# Patient Record
Sex: MALE | Race: WHITE | NOT HISPANIC OR LATINO | URBAN - METROPOLITAN AREA
[De-identification: names, ages, dates, MRNs, and addresses within clinical notes are randomized per-mention and may not be internally consistent; named-entity substitution may affect disease eponyms.]

---

## 2017-03-11 ENCOUNTER — EMERGENCY (EMERGENCY)
Facility: HOSPITAL | Age: 72
LOS: 1 days | Discharge: HOME | End: 2017-03-11
Attending: EMERGENCY MEDICINE | Admitting: EMERGENCY MEDICINE
Payer: MEDICARE

## 2017-03-11 VITALS
SYSTOLIC BLOOD PRESSURE: 132 MMHG | OXYGEN SATURATION: 94 % | HEART RATE: 60 BPM | DIASTOLIC BLOOD PRESSURE: 78 MMHG | RESPIRATION RATE: 16 BRPM

## 2017-03-11 VITALS
RESPIRATION RATE: 16 BRPM | HEART RATE: 60 BPM | TEMPERATURE: 98 F | OXYGEN SATURATION: 93 % | SYSTOLIC BLOOD PRESSURE: 124 MMHG | DIASTOLIC BLOOD PRESSURE: 79 MMHG

## 2017-03-11 DIAGNOSIS — Z79.899 OTHER LONG TERM (CURRENT) DRUG THERAPY: ICD-10-CM

## 2017-03-11 DIAGNOSIS — R07.89 OTHER CHEST PAIN: ICD-10-CM

## 2017-03-11 DIAGNOSIS — Z79.891 LONG TERM (CURRENT) USE OF OPIATE ANALGESIC: ICD-10-CM

## 2017-03-11 DIAGNOSIS — E78.5 HYPERLIPIDEMIA, UNSPECIFIED: ICD-10-CM

## 2017-03-11 DIAGNOSIS — Z88.8 ALLERGY STATUS TO OTHER DRUGS, MEDICAMENTS AND BIOLOGICAL SUBSTANCES STATUS: ICD-10-CM

## 2017-03-11 DIAGNOSIS — I10 ESSENTIAL (PRIMARY) HYPERTENSION: ICD-10-CM

## 2017-03-11 DIAGNOSIS — J44.9 CHRONIC OBSTRUCTIVE PULMONARY DISEASE, UNSPECIFIED: ICD-10-CM

## 2017-03-11 PROCEDURE — 70450 CT HEAD/BRAIN W/O DYE: CPT | Mod: 26

## 2017-03-11 PROCEDURE — 71010: CPT | Mod: 26

## 2017-03-11 PROCEDURE — 99284 EMERGENCY DEPT VISIT MOD MDM: CPT | Mod: 25

## 2017-03-11 RX ORDER — NIFEDIPINE 30 MG
1 TABLET, EXTENDED RELEASE 24 HR ORAL
Qty: 0 | Refills: 0 | COMMUNITY

## 2017-03-11 RX ORDER — ALBUTEROL 90 UG/1
2 AEROSOL, METERED ORAL
Qty: 0 | Refills: 0 | COMMUNITY

## 2017-03-11 RX ORDER — OLANZAPINE 15 MG/1
1 TABLET, FILM COATED ORAL
Qty: 0 | Refills: 0 | COMMUNITY

## 2017-03-11 RX ORDER — SIMVASTATIN 20 MG/1
1 TABLET, FILM COATED ORAL
Qty: 0 | Refills: 0 | COMMUNITY

## 2017-03-11 RX ORDER — METOPROLOL TARTRATE 50 MG
1 TABLET ORAL
Qty: 0 | Refills: 0 | COMMUNITY

## 2017-03-11 RX ORDER — CLONAZEPAM 1 MG
0 TABLET ORAL
Qty: 0 | Refills: 0 | COMMUNITY

## 2017-03-11 RX ORDER — SULFASALAZINE 500 MG
0 TABLET ORAL
Qty: 0 | Refills: 0 | COMMUNITY

## 2017-03-11 NOTE — ED PROVIDER NOTE - OBJECTIVE STATEMENT
Pt is a 70yo M with a h/o HTN, HL, COPD, Crohn's, bipolar d/o, and AF (on AC) who currently resides at [facility].  Pt BIB EMS 2/2 mechanical trip and fall while entering shelter this evening.  pt reports he tripped on the doorstep to the shelter.  He fell forward - unsure about head trauma but denies any LOC.  Reports mild pain to anterior L chest but denies any LE pain or injury. Rates pain 1/10.  ROS otherwise negative.  He had difficulty getting but reports difficulty getting up from the ground at baseline.  Also reports unsteady gait at baseline but refuses to use cane.

## 2017-03-11 NOTE — ED ADULT TRIAGE NOTE - CHIEF COMPLAINT QUOTE
"I tripped and fell on the doorway at the shelter." Pt reports weakness and difficulty getting up. Denies any pain or discomfort.

## 2017-03-11 NOTE — ED PROVIDER NOTE - MEDICAL DECISION MAKING DETAILS
Fall.  No significant injury noted.  Given age and use of anti-coagulation will ct head to r/o ICH - negative.  CXR to r/o chest pathology from fall - negative.  Instructed to f/u with his PCP for further gait evaluation.  Offered cane but declined.      I have discussed the discharge plan with the patient. The patient agrees with the plan, as discussed.  The patient understands Emergency Department diagnosis is a preliminary diagnosis often based on limited information and that the patient must adhere to the follow-up plan as discussed.  The patient understands that if the symptoms worsen he may return to the Emergency Department at any time for further evaluation and treatment.

## 2017-03-11 NOTE — ED PROVIDER NOTE - DIAGNOSTIC INTERPRETATION
Chest x-ray interpreted by ER Physician Dr. Buchanan  Findings: heart size normal, no infiltrates, lungs fully expanded, soft tissues appear normal.

## 2017-03-11 NOTE — ED ADULT NURSE REASSESSMENT NOTE - NS ED NURSE REASSESS COMMENT FT1
patient taken out to cab to [facility], they are aware he is returning home

## 2017-03-11 NOTE — ED PROVIDER NOTE - MUSCULOSKELETAL, MLM
Spine appears normal, range of motion is not limited, no muscle or joint tenderness.  No major chest wall TTP.

## 2017-03-11 NOTE — ED ADULT NURSE NOTE - PMH
A-fib    Chronic renal failure    COPD (chronic obstructive pulmonary disease)    Crohns disease    Liver disease    Schizoaffective disorder    Thoracic aortic aneurysm

## 2018-02-12 ENCOUNTER — INPATIENT (INPATIENT)
Facility: HOSPITAL | Age: 73
LOS: 2 days | Discharge: HOME | DRG: 871 | End: 2018-02-15
Attending: STUDENT IN AN ORGANIZED HEALTH CARE EDUCATION/TRAINING PROGRAM | Admitting: STUDENT IN AN ORGANIZED HEALTH CARE EDUCATION/TRAINING PROGRAM
Payer: MEDICARE

## 2018-02-12 VITALS
TEMPERATURE: 99 F | DIASTOLIC BLOOD PRESSURE: 83 MMHG | SYSTOLIC BLOOD PRESSURE: 117 MMHG | OXYGEN SATURATION: 96 % | RESPIRATION RATE: 16 BRPM | HEART RATE: 117 BPM

## 2018-02-12 DIAGNOSIS — N17.9 ACUTE KIDNEY FAILURE, UNSPECIFIED: ICD-10-CM

## 2018-02-12 DIAGNOSIS — J44.9 CHRONIC OBSTRUCTIVE PULMONARY DISEASE, UNSPECIFIED: ICD-10-CM

## 2018-02-12 DIAGNOSIS — A41.9 SEPSIS, UNSPECIFIED ORGANISM: ICD-10-CM

## 2018-02-12 DIAGNOSIS — I10 ESSENTIAL (PRIMARY) HYPERTENSION: ICD-10-CM

## 2018-02-12 DIAGNOSIS — Z29.9 ENCOUNTER FOR PROPHYLACTIC MEASURES, UNSPECIFIED: ICD-10-CM

## 2018-02-12 DIAGNOSIS — N40.0 BENIGN PROSTATIC HYPERPLASIA WITHOUT LOWER URINARY TRACT SYMPTOMS: ICD-10-CM

## 2018-02-12 DIAGNOSIS — I48.91 UNSPECIFIED ATRIAL FIBRILLATION: ICD-10-CM

## 2018-02-12 DIAGNOSIS — I71.4 ABDOMINAL AORTIC ANEURYSM, WITHOUT RUPTURE: ICD-10-CM

## 2018-02-12 DIAGNOSIS — R07.9 CHEST PAIN, UNSPECIFIED: ICD-10-CM

## 2018-02-12 DIAGNOSIS — K50.90 CROHN'S DISEASE, UNSPECIFIED, WITHOUT COMPLICATIONS: ICD-10-CM

## 2018-02-12 DIAGNOSIS — R65.10 SYSTEMIC INFLAMMATORY RESPONSE SYNDROME (SIRS) OF NON-INFECTIOUS ORIGIN WITHOUT ACUTE ORGAN DYSFUNCTION: ICD-10-CM

## 2018-02-12 DIAGNOSIS — E78.5 HYPERLIPIDEMIA, UNSPECIFIED: ICD-10-CM

## 2018-02-12 DIAGNOSIS — Z92.89 PERSONAL HISTORY OF OTHER MEDICAL TREATMENT: ICD-10-CM

## 2018-02-12 LAB
ALBUMIN SERPL ELPH-MCNC: 3.4 G/DL — SIGNIFICANT CHANGE UP (ref 3.4–5)
ALP SERPL-CCNC: 83 U/L — SIGNIFICANT CHANGE UP (ref 40–120)
ALT FLD-CCNC: 15 U/L — SIGNIFICANT CHANGE UP (ref 12–42)
ANION GAP SERPL CALC-SCNC: 6 MMOL/L — LOW (ref 9–16)
ANION GAP SERPL CALC-SCNC: 8 MMOL/L — LOW (ref 9–16)
APPEARANCE UR: CLEAR — SIGNIFICANT CHANGE UP
AST SERPL-CCNC: 12 U/L — LOW (ref 15–37)
BASOPHILS NFR BLD AUTO: 0.2 % — SIGNIFICANT CHANGE UP (ref 0–2)
BILIRUB SERPL-MCNC: 0.3 MG/DL — SIGNIFICANT CHANGE UP (ref 0.2–1.2)
BILIRUB UR-MCNC: NEGATIVE — SIGNIFICANT CHANGE UP
BUN SERPL-MCNC: 23 MG/DL — SIGNIFICANT CHANGE UP (ref 7–23)
BUN SERPL-MCNC: 25 MG/DL — HIGH (ref 7–23)
CALCIUM SERPL-MCNC: 9.3 MG/DL — SIGNIFICANT CHANGE UP (ref 8.5–10.5)
CALCIUM SERPL-MCNC: 9.9 MG/DL — SIGNIFICANT CHANGE UP (ref 8.5–10.5)
CHLORIDE SERPL-SCNC: 105 MMOL/L — SIGNIFICANT CHANGE UP (ref 96–108)
CHLORIDE SERPL-SCNC: 109 MMOL/L — HIGH (ref 96–108)
CK MB BLD-MCNC: 1.11 % — SIGNIFICANT CHANGE UP
CK MB CFR SERPL CALC: 1.4 NG/ML — SIGNIFICANT CHANGE UP (ref 0.5–3.6)
CO2 SERPL-SCNC: 27 MMOL/L — SIGNIFICANT CHANGE UP (ref 22–31)
CO2 SERPL-SCNC: 27 MMOL/L — SIGNIFICANT CHANGE UP (ref 22–31)
COLOR SPEC: YELLOW — SIGNIFICANT CHANGE UP
CREAT ?TM UR-MCNC: 22 MG/DL — SIGNIFICANT CHANGE UP
CREAT SERPL-MCNC: 2.34 MG/DL — HIGH (ref 0.5–1.3)
CREAT SERPL-MCNC: 2.62 MG/DL — HIGH (ref 0.5–1.3)
DIFF PNL FLD: (no result)
EOSINOPHIL NFR BLD AUTO: 0 % — SIGNIFICANT CHANGE UP (ref 0–6)
EXTRA BLUE TOP TUBE: SIGNIFICANT CHANGE UP
FLUAV SPEC QL CULT: NEGATIVE — SIGNIFICANT CHANGE UP
FLUBV AG SPEC QL IA: NEGATIVE — SIGNIFICANT CHANGE UP
GLUCOSE SERPL-MCNC: 152 MG/DL — HIGH (ref 70–99)
GLUCOSE SERPL-MCNC: 206 MG/DL — HIGH (ref 70–99)
GLUCOSE UR QL: NEGATIVE — SIGNIFICANT CHANGE UP
HCT VFR BLD CALC: 40.4 % — SIGNIFICANT CHANGE UP (ref 39–50)
HGB BLD-MCNC: 13 G/DL — SIGNIFICANT CHANGE UP (ref 13–17)
IMM GRANULOCYTES NFR BLD AUTO: 0.6 % — SIGNIFICANT CHANGE UP (ref 0–1.5)
KETONES UR-MCNC: NEGATIVE — SIGNIFICANT CHANGE UP
LACTATE SERPL-SCNC: 1.2 MMOL/L — SIGNIFICANT CHANGE UP (ref 0.4–2)
LACTATE SERPL-SCNC: 1.9 MMOL/L — SIGNIFICANT CHANGE UP (ref 0.4–2)
LEUKOCYTE ESTERASE UR-ACNC: (no result)
LYMPHOCYTES # BLD AUTO: 17 % — SIGNIFICANT CHANGE UP (ref 13–44)
MCHC RBC-ENTMCNC: 28.6 PG — SIGNIFICANT CHANGE UP (ref 27–34)
MCHC RBC-ENTMCNC: 32.2 G/DL — SIGNIFICANT CHANGE UP (ref 32–36)
MCV RBC AUTO: 88.8 FL — SIGNIFICANT CHANGE UP (ref 80–100)
MONOCYTES NFR BLD AUTO: 11.8 % — SIGNIFICANT CHANGE UP (ref 2–14)
NEUTROPHILS NFR BLD AUTO: 70.4 % — SIGNIFICANT CHANGE UP (ref 43–77)
NITRITE UR-MCNC: NEGATIVE — SIGNIFICANT CHANGE UP
NT-PROBNP SERPL-SCNC: 631 PG/ML — HIGH
OSMOLALITY UR: 186 MOSMOL/KG — SIGNIFICANT CHANGE UP (ref 100–650)
PH UR: 5.5 — SIGNIFICANT CHANGE UP (ref 5–8)
PLATELET # BLD AUTO: 223 K/UL — SIGNIFICANT CHANGE UP (ref 150–400)
POTASSIUM SERPL-MCNC: 4.1 MMOL/L — SIGNIFICANT CHANGE UP (ref 3.5–5.3)
POTASSIUM SERPL-MCNC: 4.6 MMOL/L — SIGNIFICANT CHANGE UP (ref 3.5–5.3)
POTASSIUM SERPL-SCNC: 4.1 MMOL/L — SIGNIFICANT CHANGE UP (ref 3.5–5.3)
POTASSIUM SERPL-SCNC: 4.6 MMOL/L — SIGNIFICANT CHANGE UP (ref 3.5–5.3)
PROT SERPL-MCNC: 8.3 G/DL — HIGH (ref 6.4–8.2)
PROT UR-MCNC: (no result) MG/DL
RAPID RVP RESULT: SIGNIFICANT CHANGE UP
RBC # BLD: 4.55 M/UL — SIGNIFICANT CHANGE UP (ref 4.2–5.8)
RBC # FLD: 13.7 % — SIGNIFICANT CHANGE UP (ref 10.3–16.9)
SODIUM SERPL-SCNC: 140 MMOL/L — SIGNIFICANT CHANGE UP (ref 132–145)
SODIUM SERPL-SCNC: 142 MMOL/L — SIGNIFICANT CHANGE UP (ref 132–145)
SODIUM UR-SCNC: 46 MMOL/L — SIGNIFICANT CHANGE UP
SP GR SPEC: <=1.005 — SIGNIFICANT CHANGE UP (ref 1–1.03)
TROPONIN I SERPL-MCNC: <0.017 NG/ML — LOW (ref 0.02–0.06)
TROPONIN I SERPL-MCNC: <0.017 NG/ML — LOW (ref 0.02–0.06)
TSH SERPL-MCNC: 0.79 UIU/ML — SIGNIFICANT CHANGE UP (ref 0.36–3.74)
UROBILINOGEN FLD QL: 0.2 E.U./DL — SIGNIFICANT CHANGE UP
UUN UR-MCNC: 151 MG/DL — SIGNIFICANT CHANGE UP
WBC # BLD: 12.8 K/UL — HIGH (ref 3.8–10.5)
WBC # FLD AUTO: 12.8 K/UL — HIGH (ref 3.8–10.5)

## 2018-02-12 PROCEDURE — 93010 ELECTROCARDIOGRAM REPORT: CPT

## 2018-02-12 PROCEDURE — 71045 X-RAY EXAM CHEST 1 VIEW: CPT | Mod: 26

## 2018-02-12 PROCEDURE — 99223 1ST HOSP IP/OBS HIGH 75: CPT | Mod: GC

## 2018-02-12 PROCEDURE — 99285 EMERGENCY DEPT VISIT HI MDM: CPT | Mod: 25

## 2018-02-12 RX ORDER — SULFASALAZINE 500 MG
500 TABLET ORAL
Qty: 0 | Refills: 0 | Status: DISCONTINUED | OUTPATIENT
Start: 2018-02-12 | End: 2018-02-15

## 2018-02-12 RX ORDER — SODIUM CHLORIDE 9 MG/ML
2000 INJECTION INTRAMUSCULAR; INTRAVENOUS; SUBCUTANEOUS ONCE
Qty: 0 | Refills: 0 | Status: COMPLETED | OUTPATIENT
Start: 2018-02-12 | End: 2018-02-12

## 2018-02-12 RX ORDER — ACETAMINOPHEN 500 MG
975 TABLET ORAL ONCE
Qty: 0 | Refills: 0 | Status: COMPLETED | OUTPATIENT
Start: 2018-02-12 | End: 2018-02-12

## 2018-02-12 RX ORDER — IPRATROPIUM/ALBUTEROL SULFATE 18-103MCG
3 AEROSOL WITH ADAPTER (GRAM) INHALATION EVERY 4 HOURS
Qty: 0 | Refills: 0 | Status: DISCONTINUED | OUTPATIENT
Start: 2018-02-12 | End: 2018-02-15

## 2018-02-12 RX ORDER — HEPARIN SODIUM 5000 [USP'U]/ML
7500 INJECTION INTRAVENOUS; SUBCUTANEOUS EVERY 8 HOURS
Qty: 0 | Refills: 0 | Status: DISCONTINUED | OUTPATIENT
Start: 2018-02-12 | End: 2018-02-13

## 2018-02-12 RX ORDER — METOPROLOL TARTRATE 50 MG
5 TABLET ORAL ONCE
Qty: 0 | Refills: 0 | Status: COMPLETED | OUTPATIENT
Start: 2018-02-12 | End: 2018-02-12

## 2018-02-12 RX ORDER — SODIUM CHLORIDE 9 MG/ML
1000 INJECTION, SOLUTION INTRAVENOUS
Qty: 0 | Refills: 0 | Status: DISCONTINUED | OUTPATIENT
Start: 2018-02-12 | End: 2018-02-12

## 2018-02-12 RX ORDER — IPRATROPIUM/ALBUTEROL SULFATE 18-103MCG
3 AEROSOL WITH ADAPTER (GRAM) INHALATION ONCE
Qty: 0 | Refills: 0 | Status: COMPLETED | OUTPATIENT
Start: 2018-02-12 | End: 2018-02-12

## 2018-02-12 RX ORDER — FLUOXETINE HCL 10 MG
20 CAPSULE ORAL DAILY
Qty: 0 | Refills: 0 | Status: DISCONTINUED | OUTPATIENT
Start: 2018-02-12 | End: 2018-02-15

## 2018-02-12 RX ORDER — MAGNESIUM SULFATE 500 MG/ML
2 VIAL (ML) INJECTION ONCE
Qty: 0 | Refills: 0 | Status: COMPLETED | OUTPATIENT
Start: 2018-02-12 | End: 2018-02-12

## 2018-02-12 RX ORDER — ACETAMINOPHEN 500 MG
650 TABLET ORAL EVERY 6 HOURS
Qty: 0 | Refills: 0 | Status: DISCONTINUED | OUTPATIENT
Start: 2018-02-12 | End: 2018-02-15

## 2018-02-12 RX ORDER — LEVALBUTEROL 1.25 MG/.5ML
1.25 SOLUTION, CONCENTRATE RESPIRATORY (INHALATION) ONCE
Qty: 0 | Refills: 0 | Status: COMPLETED | OUTPATIENT
Start: 2018-02-12 | End: 2018-02-12

## 2018-02-12 RX ORDER — OLANZAPINE 15 MG/1
15 TABLET, FILM COATED ORAL DAILY
Qty: 0 | Refills: 0 | Status: DISCONTINUED | OUTPATIENT
Start: 2018-02-12 | End: 2018-02-15

## 2018-02-12 RX ORDER — TAMSULOSIN HYDROCHLORIDE 0.4 MG/1
0.4 CAPSULE ORAL AT BEDTIME
Qty: 0 | Refills: 0 | Status: DISCONTINUED | OUTPATIENT
Start: 2018-02-12 | End: 2018-02-15

## 2018-02-12 RX ORDER — SIMVASTATIN 20 MG/1
40 TABLET, FILM COATED ORAL AT BEDTIME
Qty: 0 | Refills: 0 | Status: DISCONTINUED | OUTPATIENT
Start: 2018-02-12 | End: 2018-02-15

## 2018-02-12 RX ORDER — METOPROLOL TARTRATE 50 MG
50 TABLET ORAL ONCE
Qty: 0 | Refills: 0 | Status: COMPLETED | OUTPATIENT
Start: 2018-02-12 | End: 2018-02-12

## 2018-02-12 RX ORDER — METOPROLOL TARTRATE 50 MG
50 TABLET ORAL
Qty: 0 | Refills: 0 | Status: DISCONTINUED | OUTPATIENT
Start: 2018-02-13 | End: 2018-02-15

## 2018-02-12 RX ORDER — APIXABAN 2.5 MG/1
2.5 TABLET, FILM COATED ORAL EVERY 12 HOURS
Qty: 0 | Refills: 0 | Status: DISCONTINUED | OUTPATIENT
Start: 2018-02-12 | End: 2018-02-15

## 2018-02-12 RX ADMIN — HEPARIN SODIUM 7500 UNIT(S): 5000 INJECTION INTRAVENOUS; SUBCUTANEOUS at 22:58

## 2018-02-12 RX ADMIN — SODIUM CHLORIDE 4000 MILLILITER(S): 9 INJECTION INTRAMUSCULAR; INTRAVENOUS; SUBCUTANEOUS at 09:43

## 2018-02-12 RX ADMIN — Medication 50 GRAM(S): at 13:20

## 2018-02-12 RX ADMIN — Medication 125 MILLIGRAM(S): at 09:52

## 2018-02-12 RX ADMIN — Medication 50 MILLIGRAM(S): at 11:00

## 2018-02-12 RX ADMIN — SODIUM CHLORIDE 1000 MILLILITER(S): 9 INJECTION, SOLUTION INTRAVENOUS at 12:07

## 2018-02-12 RX ADMIN — Medication 975 MILLIGRAM(S): at 10:52

## 2018-02-12 RX ADMIN — Medication 3 MILLILITER(S): at 09:52

## 2018-02-12 RX ADMIN — SODIUM CHLORIDE 150 MILLILITER(S): 9 INJECTION, SOLUTION INTRAVENOUS at 14:33

## 2018-02-12 RX ADMIN — TAMSULOSIN HYDROCHLORIDE 0.4 MILLIGRAM(S): 0.4 CAPSULE ORAL at 23:39

## 2018-02-12 RX ADMIN — LEVALBUTEROL 1.25 MILLIGRAM(S): 1.25 SOLUTION, CONCENTRATE RESPIRATORY (INHALATION) at 13:20

## 2018-02-12 RX ADMIN — Medication 3 MILLILITER(S): at 21:51

## 2018-02-12 RX ADMIN — Medication 975 MILLIGRAM(S): at 09:52

## 2018-02-12 RX ADMIN — Medication 5 MILLIGRAM(S): at 13:20

## 2018-02-12 NOTE — H&P ADULT - NSHPLABSRESULTS_GEN_ALL_CORE
LABS:                        13.0   12.8  )-----------( 223      ( 12 Feb 2018 09:46 )             40.4     02-12    142  |  109<H>  |  23  ----------------------------<  206<H>  4.6   |  27  |  2.34<H>    Ca    9.3      12 Feb 2018 15:22    TPro  8.3<H>  /  Alb  3.4  /  TBili  0.3  /  DBili  x   /  AST  12<L>  /  ALT  15  /  AlkPhos  83  02-12      Urinalysis Basic - ( 12 Feb 2018 12:07 )    Color: Yellow / Appearance: Clear / SG: <=1.005 / pH: x  Gluc: x / Ketone: NEGATIVE  / Bili: NEGATIVE / Urobili: 0.2 E.U./dL   Blood: x / Protein: Trace mg/dL / Nitrite: NEGATIVE   Leuk Esterase: Trace / RBC: < 5 /HPF / WBC 5-10 /HPF   Sq Epi: x / Non Sq Epi: x / Bacteria: Present /HPF LABS:                        13.0   12.8  )-----------( 223      ( 12 Feb 2018 09:46 )             40.4     02-12    142  |  109<H>  |  23  ----------------------------<  206<H>  4.6   |  27  |  2.34<H>    Ca    9.3      12 Feb 2018 15:22    TPro  8.3<H>  /  Alb  3.4  /  TBili  0.3  /  DBili  x   /  AST  12<L>  /  ALT  15  /  AlkPhos  83  02-12      Urinalysis Basic - ( 12 Feb 2018 12:07 )    Color: Yellow / Appearance: Clear / SG: <=1.005 / pH: x  Gluc: x / Ketone: NEGATIVE  / Bili: NEGATIVE / Urobili: 0.2 E.U./dL   Blood: x / Protein: Trace mg/dL / Nitrite: NEGATIVE   Leuk Esterase: Trace / RBC: < 5 /HPF / WBC 5-10 /HPF   Sq Epi: x / Non Sq Epi: x / Bacteria: Present /HPF    Repeat CXR:  possible blunting of right cardiac border, possible infiltrate.

## 2018-02-12 NOTE — H&P ADULT - HISTORY OF PRESENT ILLNESS
71 yo M with PMHx of Afib (on eliquis 2.5mg BID), HTN, HLD, COPD (no home O2), never been intubated, abdominal aneurysm (~4cm one yr ago), Crohn's disease, schizophrenia, anxiety, BIBA from [facility] for CP and feeling unwell. Patient with associated cough and chills.    ED vitals:  T(C): 35.9 (02-12-18 @ 21:11), Max: 38.6 (02-12-18 @ 09:44)  T(F): 96.7 (02-12-18 @ 21:11), Max: 101.4 (02-12-18 @ 09:44)  HR: 100 (02-12-18 @ 21:11) (79 - 124)  BP: 136/85 (02-12-18 @ 21:11) (97/71 - 147/86)  RR: 17 (02-12-18 @ 21:11) (16 - 18)  SpO2: 93% (02-12-18 @ 21:11) (93% - 99%)    ED gave:  Levaquin, 3L isotonic fluid, Started D51/2 NS at 150 cc/hr. Solumedrol 125 mg iv, lopressor 5 iv, lopressor 50 mg po x1, multiple nebs. 73 yo M with PMHx of Afib (on eliquis 2.5mg BID), HTN, HLD, COPD (no home O2), never been intubated, abdominal aneurysm (~4cm one yr ago), Crohn's disease, schizophrenia, anxiety, BIBA from [facility] for CP that was sharp, substernal, lasting 2 hours, and self resolving. Patient reports chills for about a week. Also endorsing 1 week of increased urinary frequency with feeling of incomplete voiding. He reports chronic LLE edema but no pain. Denies hx of clots in the legs or lungs before. Denies hemoptysis or malignancy. Currently the patient has no more chest pain. He has chronic shortness of breath from his COPD with exertion but not more than usual. Denies SOB at rest. He has cough associated only with smoking. But not otherwise. Denies nausea, vomiting, diarrhea, headache, rash, dysuria, back pain.     ED vitals:  T(C): 35.9 (02-12-18 @ 21:11), Max: 38.6 (02-12-18 @ 09:44)  T(F): 96.7 (02-12-18 @ 21:11), Max: 101.4 (02-12-18 @ 09:44)  HR: 100 (02-12-18 @ 21:11) (79 - 124)  BP: 136/85 (02-12-18 @ 21:11) (97/71 - 147/86)  RR: 17 (02-12-18 @ 21:11) (16 - 18)  SpO2: 93% (02-12-18 @ 21:11) (93% - 99%)    ED gave:  Levaquin, 3L isotonic fluid, Started D51/2 NS at 150 cc/hr. Solumedrol 125 mg iv, lopressor 5 iv, lopressor 50 mg po x1, multiple nebs. 73 yo M with PMHx of Afib (on eliquis 2.5mg BID), HTN, HLD, COPD (no home O2), never been intubated, abdominal aneurysm (~4cm one yr ago), Crohn's disease, schizophrenia, anxiety, BIBA from [facility] for CP that was sharp, substernal, lasting 2 hours, and self resolving. Patient reports chills for about a week. Also endorsing 1 week of increased urinary frequency with feeling of incomplete voiding. He reports chronic LLE edema but no pain. Denies hx of clots in the legs or lungs before. Denies hemoptysis or malignancy. Currently the patient has no more chest pain. He has chronic shortness of breath from his COPD with exertion but not more than usual. Denies SOB at rest. He has cough associated only with smoking. But not otherwise. Denies nausea, vomiting, diarrhea, headache, rash, dysuria, back pain.     ED vitals:  T(C): 35.9 (02-12-18 @ 21:11), Max: 38.6 (02-12-18 @ 09:44)  T(F): 96.7 (02-12-18 @ 21:11), Max: 101.4 (02-12-18 @ 09:44)  HR: 100 (02-12-18 @ 21:11) (79 - 124)  BP: 136/85 (02-12-18 @ 21:11) (97/71 - 147/86)  RR: 17 (02-12-18 @ 21:11) (16 - 18)  SpO2: 93% (02-12-18 @ 21:11) (93% - 99%)    ED gave:  Levaquin, 3L isotonic fluid, Started D51/2 NS at 150 cc/hr. Solumedrol 125 mg iv, lopressor 5 iv, lopressor 50 mg po x1, multiple nebs.

## 2018-02-12 NOTE — ED PROVIDER NOTE - PMH
Abdominal aneurysm    Atrial fibrillation    COPD (chronic obstructive pulmonary disease)    Hyperlipidemia    Hypertension    Schizophrenia

## 2018-02-12 NOTE — H&P ADULT - PROBLEM SELECTOR PLAN 4
-Shannon standing for now. BUN/Cr ratio of 11.8 going against prerenal etiology. Possible post renal or intrinsic.  -Urine lytes.   -Consider RP US.  -Nixon in place.  -monitor UOP  -Avoid nephrotoxic medications. BUN/Cr ratio of 11.8 going against prerenal etiology. Possible post renal or intrinsic.  -Urine lytes.   -RP US.   -Nixon in place.  -monitor UOP  -Avoid nephrotoxic medications.

## 2018-02-12 NOTE — ED PROVIDER NOTE - MEDICAL DECISION MAKING DETAILS
pt p/w cp, cough, wheezing, noted febrile to 101.5 in the ED, tachycardiac to 140-150s on arrival, A.fib w RVR likely 2/2 infectious etiology, pan cultured in the ED, noted RLL infiltrate on CXR, covered with dose of levaquin in the ED, mild leukocytosis to 13K, Cr 2.6, unknown baseline, pt also c/o difficulty with urination, ?post obstructive vs volume depletion, reagan inserted, given low dose po metoprolol for better rate control, will admit to [facility] medicine for sepsis 2/2 PNA and DONNA pt p/w cp, cough, wheezing, noted febrile to 101.5 in the ED, tachycardiac to 140-150s on arrival, A.fib w RVR likely 2/2 infectious etiology, pan cultured in the ED, noted RLL infiltrate on CXR, covered with dose of levaquin in the ED, mild leukocytosis to 13K, Cr 2.6, unknown baseline, pt also c/o difficulty with urination, ?post obstructive vs volume depletion, reagan inserted, given low dose po metoprolol for better rate control, will admit to [facility] medicine for sepsis 2/2 PNA/copd exacerbation, and DONNA, case discussed with Dr. Whalen and LYNDSEY

## 2018-02-12 NOTE — ED PROVIDER NOTE - ATTENDING CONTRIBUTION TO CARE
respiratory infection (flu negative) with hx of afib presented in rapid afib.  Likely underlying COPD exacerbation as well.  Vitals improved.  Lactate wnl.  WBC elevated.  No increased WOB.  Met sepsis criteria on arrival.  Accepted by the [facility] team.  Given fluids, abx, nebs, steroids, tyelnol

## 2018-02-12 NOTE — H&P ADULT - PROBLEM SELECTOR PLAN 10
HSQ    #FEN  -S/p 3L NS in ED and D51/2NS at 150 cc/hr while en route.   -Replete lytes prn.  -Regular diet.    #CODE STATUS   -FULL CODE

## 2018-02-12 NOTE — H&P ADULT - PROBLEM SELECTOR PLAN 9
TANIYA in AM. HSQ    #FEN  -S/p 3L NS in ED and D51/2NS at 150 cc/hr while en route. D/berenice fluids.   -Replete lytes prn.  -Regular diet.    #CODE STATUS   -FULL CODE Eliquis    #FEN  -S/p 3L NS in ED and D51/2NS at 150 cc/hr while en route. D/berenice fluids.   -Replete lytes prn.  -Regular diet.    #CODE STATUS   -FULL CODE

## 2018-02-12 NOTE — H&P ADULT - PROBLEM SELECTOR PLAN 7
4 cm dx 1 year ago. No abx pain. HD stable now.   -Serial abd exams  -Outpatient followup abd US. - -Holding anti-htn in setting of SIRS except 1/2 home dose Lopressor to prevent rebound tachycardia.

## 2018-02-12 NOTE — H&P ADULT - ATTENDING COMMENTS
Pt seen and examined at bedside on 2/12/2018 @ 2300    Agree with HPIKAYLYNN as above. Patient has no complaints. Initial complaint - chest pain has now resolved.     VS, Labs, FH, SH, allergies, medications, imaging reviewed. I personally reviewed the CXR - no overt consolidation. Agree with physical exam as above     A/P: 73 yo M with PMHx of Afib (on eliquis 2.5mg BID), HTN, HLD, COPD (no home O2), never been intubated, abdominal aneurysm (~4cm one yr ago), Crohn's disease, schizophrenia, anxiety, BIBA from [facility] for chest pain. Found to be febrile in ED with leukocytosis and DONNA.     **Sepsis  -Pt meeting 3/4 SIRS criteria with possible viral URI despite negative RVP  -Pt with no localizing symptoms at this time  -CXR with no overt consolidation  -U/A very weakly positive, s/p FC placement  -Pt given Levaquin in ED - will hold off on further antibiotics at this time  -f/u cultures  -LA wnl x 2    **Chest pain  -Resolved, unlikely cardiac etiology  -EKG with no acute ischemic pathology, troponin wnl x 2    **Acute Renal Failure  -No known history of CKD  -BUN/Cr ratio not c/w prerenal etiology  -Check urine electrolytes  -Trend BMP  -Check renal ultrasound  -Strict i/o's    Plan otherwise as outlined above....

## 2018-02-12 NOTE — ED ADULT NURSE NOTE - OBJECTIVE STATEMENT
Patient is a 73 y/o male presenting to the ED with mild SOB, cough, and body aches. Patient is a 71 y/o male presenting to the ED with mild SOB, cough, and weakness, body aches. During initial assessment, patient sepsis upgraded. Patient denies N/V/D/C, HA, CP. Patient in NAD, on 4L NC, resting comfortably and will continue to monitor. Patient is a 71 y/o male presenting to the ED with midsternal chest pressure, mild SOB, cough, and weakness, body aches. During initial assessment, patient sepsis upgraded. Patient denies N/V/D/C, HA, CP. Patient in NAD, on 4L NC, resting comfortably and will continue to monitor.

## 2018-02-12 NOTE — H&P ADULT - PROBLEM SELECTOR PLAN 8
C/w home terazosin equivalent. 4 cm dx 1 year ago. No abx pain. HD stable now.   -Serial abd exams  -Outpatient followup abd US.

## 2018-02-12 NOTE — ED PROVIDER NOTE - CARE PLAN
Principal Discharge DX:	Pneumonia  Secondary Diagnosis:	Acute kidney injury Principal Discharge DX:	Pneumonia  Secondary Diagnosis:	Acute kidney injury  Secondary Diagnosis:	COPD exacerbation

## 2018-02-12 NOTE — H&P ADULT - NSHPPHYSICALEXAM_GEN_ALL_CORE
Appearance: NAD. Speaking in full sentences.   HEENT:   Conjunctiva clear b/l. Moist oral mucosa.  Cardiovascular:  Irregularly irregular rhythm. Regular rate. No murmurs.   Respiratory:  Trace expiratory wheeze noted. No rhonchi or rales noted b/l.   Gastrointestinal:  Soft, nontender. Non-distended. Non-rigid.	  Extremities:  2+ LLE edema with chronic venous stasis changes. RLE trace edema. No erythema b/l. LE WWP b/l.  Vascular: DP 2+ b/l.  Neurologic:  Alert and awake. Moving all extremities. Following commands. Making eye contact.  :  indwelling reagan in place. Appearance: NAD. Speaking in full sentences.   HEENT:   Conjunctiva clear b/l. Moist oral mucosa.  Cardiovascular:  Irregularly irregular rhythm. Regular rate. No murmurs.   Respiratory:  Trace expiratory wheeze noted. No rhonchi or rales noted b/l.   Gastrointestinal:  Soft, nontender. Non-distended. Non-rigid.	  Extremities:  2+ LLE edema with chronic venous stasis changes. RLE trace edema. No erythema b/l. LE WWP b/l.  Vascular: DP 2+ b/l.  Neurologic:  Alert and awake. Moving all extremities. Following commands. Making eye contact.  :  indwelling reagan in place.  Psych: normal affect

## 2018-02-12 NOTE — ED PROVIDER NOTE - PHYSICAL EXAMINATION
Gen - WDWN M, slightly tachypneic appearing, speaking in full sentences   Skin - warm, dry, intact  HEENT - AT/NC, PERRL, EOMI, no conjunctival injection, dry oral mucosa, o/p clear with no erythema, edema, or exudate, uvula midline, airway patent, neck supple and NT, FROM, no JVD or carotid bruits b/l, no palpable nodes  CV - S1S2, rapid rate, irregularly irregular  Resp - respiration slightly tachypneic, diffuse wheezing b/l, L>R, no rhonchi or rales   GI - NABS, soft, ND, NT, no rebound or guarding, no CVAT b/l   MS - w/w/p, chronic venous stasis skin changes BLE, LLE>R with 2+edema, calves supple and NT, distal pulses symmetric b/l   Neuro - AxOx3, no focal neuro deficits, CN II-XII grossly intact, ambulatory without gait disturbance

## 2018-02-12 NOTE — H&P ADULT - PROBLEM SELECTOR PLAN 3
BUN/Cr ratio of 11.8 going against prerenal etiology. Possible post renal or intrinsic.  -Urine lytes.   -Consider RP US.  -Nixon in place.  -monitor UOP  -Avoid nephrotoxic medications. Was in RVR. Rate controlled now.   -S/p lopressor 5 mg ivp x1 and Lopressor 50 mg po x1. Was in RVR. Rate controlled now.   -S/p lopressor 5 mg ivp x1 and Lopressor 50 mg po x1.  -continue 1/2 home dose loprssor to prevent afib w/ rvr given SIRS.  -C/w home eliquis.

## 2018-02-12 NOTE — H&P ADULT - PROBLEM SELECTOR PLAN 1
Atypical. Resolved.  EKG with afib with rvr initially and nonspecific st-t changes. Repeat EKG showing Afib with improved rate and same nonspecific st-t changes. Trop negative x2. Patient came in for chest pain but now resolved. CXR with possible blunting of right cardiac border which may be consolidation but patient without active cough or shortness of breath. CXR generally has mild vascular congestion. No more fevers as well. RVP negative. Patient endorsed urinary frequency but has BPH, but must consider UTI. Leukocytosis is mild and could be reactive. Lastly, LE DVT may be cause of fever.  -Will hold of on abx for now given lack of clear source of infection.  -F/u Bcx  -LE venous dopplers to r/o DVT  -Holding anti-htn in setting of SIRS except 1/2 home dose Lopressor to prevent rebound tachycardia.  -Repeat CBC with diff in AM.

## 2018-02-12 NOTE — H&P ADULT - PROBLEM SELECTOR PLAN 2
Was in RVR. Rate controlled now.   -S/p lopressor 5 mg ivp x1 and Lopressor 50 mg po x1. Atypical. Resolved.  EKG with afib with rvr initially and nonspecific st-t changes. Repeat EKG showing Afib with improved rate and same nonspecific st-t changes. Trop negative x2. Atypical. Resolved.  EKG with afib with rvr initially and nonspecific st-t changes. Repeat EKG showing Afib with improved rate and same nonspecific st-t changes. Trop negative x2. Must consider PNA as well.  -Repeat EKG in AM.

## 2018-02-12 NOTE — ED PROVIDER NOTE - OBJECTIVE STATEMENT
71 yo M with PMHx of Shira, on eliquis 2.5,g BID, HTN, HLD, COPD, no home O2 use, never been intubated, schizophrenia, anxiety, BIBA for CP and feeling unwell.  Pt reports feeling chest pressure sensation in the mid sternal region around 7am this morning while listening to the radio and started feeling unwell and lightheadedness.  Noted chills and chronic productive cough.  Denies sore throat, palpitations, wheezing, abdominal pain, N/V/D/C, change in bowel function, dysuria, hematuria, flank pain, malaise, rash, HA, and LOC. Has chronic LLE swelling x 2 yrs and has been having difficulty urination in the past few days.  No recent travel or sick contact noted. 73 yo M with PMHx of Shira, on eliquis 2.5,g BID, HTN, HLD, COPD, no home O2 use, never been intubated, abdominal aneurysm (~4cm one yr ago), schizophrenia, anxiety, BIBA from [facility] for CP and feeling unwell.  Pt reports feeling chest pressure sensation in the mid sternal region around 7am this morning while listening to the radio and started feeling unwell and lightheadedness.  Noted chills and chronic productive cough.  Denies sore throat, palpitations, wheezing, abdominal pain, N/V/D/C, change in bowel function, dysuria, hematuria, flank pain, malaise, rash, HA, and LOC. Has chronic LLE swelling x 2 yrs and has been having difficulty urination in the past few days.  No recent travel or sick contact noted.

## 2018-02-12 NOTE — ED ADULT NURSE REASSESSMENT NOTE - NS ED NURSE REASSESS COMMENT FT1
First attempt to call report to [facility]. RN will not accept patient due to cardiac history. PRAVEENA Pantoja and Dr. Ramirez aware.

## 2018-02-13 DIAGNOSIS — J18.9 PNEUMONIA, UNSPECIFIED ORGANISM: ICD-10-CM

## 2018-02-13 DIAGNOSIS — K50.919 CROHN'S DISEASE, UNSPECIFIED, WITH UNSPECIFIED COMPLICATIONS: ICD-10-CM

## 2018-02-13 DIAGNOSIS — J44.9 CHRONIC OBSTRUCTIVE PULMONARY DISEASE, UNSPECIFIED: ICD-10-CM

## 2018-02-13 DIAGNOSIS — I48.91 UNSPECIFIED ATRIAL FIBRILLATION: ICD-10-CM

## 2018-02-13 DIAGNOSIS — F20.9 SCHIZOPHRENIA, UNSPECIFIED: ICD-10-CM

## 2018-02-13 DIAGNOSIS — I10 ESSENTIAL (PRIMARY) HYPERTENSION: ICD-10-CM

## 2018-02-13 DIAGNOSIS — F41.8 OTHER SPECIFIED ANXIETY DISORDERS: ICD-10-CM

## 2018-02-13 LAB
ANION GAP SERPL CALC-SCNC: 13 MMOL/L — SIGNIFICANT CHANGE UP (ref 5–17)
BASOPHILS NFR BLD AUTO: 0.1 % — SIGNIFICANT CHANGE UP (ref 0–2)
BLD GP AB SCN SERPL QL: NEGATIVE — SIGNIFICANT CHANGE UP
BUN SERPL-MCNC: 24 MG/DL — HIGH (ref 7–23)
CALCIUM SERPL-MCNC: 9.2 MG/DL — SIGNIFICANT CHANGE UP (ref 8.4–10.5)
CHLORIDE SERPL-SCNC: 106 MMOL/L — SIGNIFICANT CHANGE UP (ref 96–108)
CO2 SERPL-SCNC: 24 MMOL/L — SIGNIFICANT CHANGE UP (ref 22–31)
CREAT SERPL-MCNC: 2.2 MG/DL — HIGH (ref 0.5–1.3)
CULTURE RESULTS: SIGNIFICANT CHANGE UP
GLUCOSE SERPL-MCNC: 88 MG/DL — SIGNIFICANT CHANGE UP (ref 70–99)
HCT VFR BLD CALC: 35.8 % — LOW (ref 39–50)
HCV AB S/CO SERPL IA: 0.26 S/CO — SIGNIFICANT CHANGE UP
HCV AB SERPL-IMP: SIGNIFICANT CHANGE UP
HGB BLD-MCNC: 11.2 G/DL — LOW (ref 13–17)
HIV 1+2 AB+HIV1 P24 AG SERPL QL IA: SIGNIFICANT CHANGE UP
LYMPHOCYTES # BLD AUTO: 13.4 % — SIGNIFICANT CHANGE UP (ref 13–44)
MAGNESIUM SERPL-MCNC: 2.5 MG/DL — SIGNIFICANT CHANGE UP (ref 1.6–2.6)
MCHC RBC-ENTMCNC: 27.9 PG — SIGNIFICANT CHANGE UP (ref 27–34)
MCHC RBC-ENTMCNC: 31.3 G/DL — LOW (ref 32–36)
MCV RBC AUTO: 89.1 FL — SIGNIFICANT CHANGE UP (ref 80–100)
MONOCYTES NFR BLD AUTO: 12.9 % — SIGNIFICANT CHANGE UP (ref 2–14)
NEUTROPHILS NFR BLD AUTO: 73.6 % — SIGNIFICANT CHANGE UP (ref 43–77)
PLATELET # BLD AUTO: 201 K/UL — SIGNIFICANT CHANGE UP (ref 150–400)
POTASSIUM SERPL-MCNC: 4.8 MMOL/L — SIGNIFICANT CHANGE UP (ref 3.5–5.3)
POTASSIUM SERPL-SCNC: 4.8 MMOL/L — SIGNIFICANT CHANGE UP (ref 3.5–5.3)
RBC # BLD: 4.02 M/UL — LOW (ref 4.2–5.8)
RBC # FLD: 14 % — SIGNIFICANT CHANGE UP (ref 10.3–16.9)
RH IG SCN BLD-IMP: POSITIVE — SIGNIFICANT CHANGE UP
SODIUM SERPL-SCNC: 143 MMOL/L — SIGNIFICANT CHANGE UP (ref 135–145)
SPECIMEN SOURCE: SIGNIFICANT CHANGE UP
WBC # BLD: 14.3 K/UL — HIGH (ref 3.8–10.5)
WBC # FLD AUTO: 14.3 K/UL — HIGH (ref 3.8–10.5)

## 2018-02-13 PROCEDURE — 76770 US EXAM ABDO BACK WALL COMP: CPT | Mod: 26

## 2018-02-13 PROCEDURE — 99233 SBSQ HOSP IP/OBS HIGH 50: CPT

## 2018-02-13 PROCEDURE — 93970 EXTREMITY STUDY: CPT | Mod: 26

## 2018-02-13 RX ORDER — AZITHROMYCIN 500 MG/1
250 TABLET, FILM COATED ORAL DAILY
Qty: 0 | Refills: 0 | Status: DISCONTINUED | OUTPATIENT
Start: 2018-02-13 | End: 2018-02-15

## 2018-02-13 RX ORDER — CEFTRIAXONE 500 MG/1
1000 INJECTION, POWDER, FOR SOLUTION INTRAMUSCULAR; INTRAVENOUS ONCE
Qty: 0 | Refills: 0 | Status: COMPLETED | OUTPATIENT
Start: 2018-02-13 | End: 2018-02-13

## 2018-02-13 RX ORDER — CEFTRIAXONE 500 MG/1
INJECTION, POWDER, FOR SOLUTION INTRAMUSCULAR; INTRAVENOUS
Qty: 0 | Refills: 0 | Status: DISCONTINUED | OUTPATIENT
Start: 2018-02-13 | End: 2018-02-15

## 2018-02-13 RX ORDER — CEFTRIAXONE 500 MG/1
1000 INJECTION, POWDER, FOR SOLUTION INTRAMUSCULAR; INTRAVENOUS EVERY 24 HOURS
Qty: 0 | Refills: 0 | Status: DISCONTINUED | OUTPATIENT
Start: 2018-02-14 | End: 2018-02-15

## 2018-02-13 RX ORDER — CEFTRIAXONE 500 MG/1
INJECTION, POWDER, FOR SOLUTION INTRAMUSCULAR; INTRAVENOUS
Qty: 0 | Refills: 0 | Status: DISCONTINUED | OUTPATIENT
Start: 2018-02-13 | End: 2018-02-13

## 2018-02-13 RX ADMIN — Medication 3 MILLILITER(S): at 17:46

## 2018-02-13 RX ADMIN — CEFTRIAXONE 1000 MILLIGRAM(S): 500 INJECTION, POWDER, FOR SOLUTION INTRAMUSCULAR; INTRAVENOUS at 11:15

## 2018-02-13 RX ADMIN — AZITHROMYCIN 250 MILLIGRAM(S): 500 TABLET, FILM COATED ORAL at 16:02

## 2018-02-13 RX ADMIN — Medication 50 MILLIGRAM(S): at 17:47

## 2018-02-13 RX ADMIN — Medication 3 MILLILITER(S): at 05:17

## 2018-02-13 RX ADMIN — APIXABAN 2.5 MILLIGRAM(S): 2.5 TABLET, FILM COATED ORAL at 16:01

## 2018-02-13 RX ADMIN — Medication 500 MILLIGRAM(S): at 17:47

## 2018-02-13 RX ADMIN — Medication 500 MILLIGRAM(S): at 05:17

## 2018-02-13 RX ADMIN — OLANZAPINE 15 MILLIGRAM(S): 15 TABLET, FILM COATED ORAL at 16:02

## 2018-02-13 RX ADMIN — Medication 3 MILLILITER(S): at 11:15

## 2018-02-13 RX ADMIN — Medication 500 MILLIGRAM(S): at 00:02

## 2018-02-13 RX ADMIN — APIXABAN 2.5 MILLIGRAM(S): 2.5 TABLET, FILM COATED ORAL at 00:02

## 2018-02-13 RX ADMIN — Medication 50 MILLIGRAM(S): at 05:17

## 2018-02-13 RX ADMIN — Medication 3 MILLILITER(S): at 02:09

## 2018-02-13 RX ADMIN — Medication 20 MILLIGRAM(S): at 16:03

## 2018-02-13 NOTE — PROGRESS NOTE ADULT - PROBLEM SELECTOR PLAN 9
DVT: on eliquis    #FEN  -S/p 3L NS in ED and D51/2NS at 150 cc/hr while en route. D/berenice fluids.   -Replete lytes prn.  -Regular diet.    #CODE STATUS   -FULL CODE

## 2018-02-13 NOTE — PROGRESS NOTE ADULT - SUBJECTIVE AND OBJECTIVE BOX
INTERVAL HPI/OVERNIGHT EVENTS:  Patient was seen and examined at bedside. As per nurse and patient, no o/n events, patient resting comfortably. No complaints at this time. Patient denies: fever, chills, dizziness, weakness, HA, Changes in vision, CP, palpitations, SOB, cough, N/V/D/C, dysuria, changes in bowel movements, LE edema.    VITAL SIGNS:  T(F): 96.3 (02-13-18 @ 09:03)  HR: 77 (02-13-18 @ 09:03)  BP: 134/82 (02-13-18 @ 09:03)  RR: 18 (02-13-18 @ 09:03)  SpO2: 94% (02-13-18 @ 09:03)  Wt(kg): --    PHYSICAL EXAM:    Constitutional: WDWN, NAD  Eyes: PERRL, EOMI, sclera non-icteric  Neck: supple, trachea midline, no masses, no JVD  Respiratory: CTA b/l, good air entry b/l, no wheezing, no rhonchi, no rales, without accessory muscle use and no intercostal retractions  Cardiovascular: RRR, normal S1S2, no M/R/G  Gastrointestinal: soft, NTND, no masses palpable, BS normal  Extremities: Warm, well perfused, pulses equal bilateral upper and lower extremities, no edema, no clubbing  Neurological: AAOx3, CN Grossly intact  Skin: Normal temperature, warm, dry    MEDICATIONS  (STANDING):  ALBUTerol/ipratropium for Nebulization 3 milliLiter(s) Nebulizer every 4 hours  apixaban 2.5 milliGRAM(s) Oral every 12 hours  azithromycin   Tablet 250 milliGRAM(s) Oral daily  cefTRIAXone Injectable. 1000 milliGRAM(s) IV Push once  cefTRIAXone Injectable.      FLUoxetine 20 milliGRAM(s) Oral daily  metoprolol     tartrate 50 milliGRAM(s) Oral two times a day  OLANZapine 15 milliGRAM(s) Oral daily  simvastatin 40 milliGRAM(s) Oral at bedtime  sulfaSALAzine 500 milliGRAM(s) Oral four times a day  tamsulosin 0.4 milliGRAM(s) Oral at bedtime    MEDICATIONS  (PRN):  acetaminophen   Tablet 650 milliGRAM(s) Oral every 6 hours PRN For Temp greater than 38 C (100.4 F) or MILD PAIN (1-3)      Allergies    Thorazine (Hepatotoxicity)    Intolerances        LABS:                        11.2   14.3  )-----------( 201      ( 13 Feb 2018 08:27 )             35.8     02-13    143  |  106  |  24<H>  ----------------------------<  88  4.8   |  24  |  2.20<H>    Ca    9.2      13 Feb 2018 08:27  Mg     2.5     02-13    TPro  8.3<H>  /  Alb  3.4  /  TBili  0.3  /  DBili  x   /  AST  12<L>  /  ALT  15  /  AlkPhos  83  02-12      Urinalysis Basic - ( 12 Feb 2018 12:07 )    Color: Yellow / Appearance: Clear / SG: <=1.005 / pH: x  Gluc: x / Ketone: NEGATIVE  / Bili: NEGATIVE / Urobili: 0.2 E.U./dL   Blood: x / Protein: Trace mg/dL / Nitrite: NEGATIVE   Leuk Esterase: Trace / RBC: < 5 /HPF / WBC 5-10 /HPF   Sq Epi: x / Non Sq Epi: x / Bacteria: Present /HPF        RADIOLOGY & ADDITIONAL TESTS: INTERVAL HPI/OVERNIGHT EVENTS:  Patient was seen and examined at bedside. As per nurse and patient, no o/n events, patient resting comfortably. The patient states that the chest pain resolved on its own by the time he arrived to the hospital. He notes that he has baseline SOB and cough however it has not increased. He still has increased urinary frequency and incomplete emptying, with urgency present as well. He denies dysuria and hematuria. Patient denies: fever, chills, dizziness, weakness, HA, Changes in vision, CP, palpitations, N/V/D/C, dysuria, changes in bowel movements, LE edema.    VITAL SIGNS:  T(F): 96.3 (02-13-18 @ 09:03)  HR: 77 (02-13-18 @ 09:03)  BP: 134/82 (02-13-18 @ 09:03)  RR: 18 (02-13-18 @ 09:03)  SpO2: 94% (02-13-18 @ 09:03)  Wt(kg): --    PHYSICAL EXAM:    Constitutional: WDWN, NAD  Eyes: PERRL, EOMI, sclera non-icteric  Neck: supple, trachea midline, no masses, no JVD  Respiratory: mild diffuse expiratory wheezing, no rhonchi, no rales, without accessory muscle use and no intercostal retractions  Cardiovascular: irregularly irregular, normal S1S2, no M/R/G  Gastrointestinal: soft, NTND, no masses palpable, BS normal  Extremities: Warm, well perfused, pulses equal bilateral upper and lower extremities, 2+ LLE edema with chronic venous changes present, RLE with trace eddema  Neurological: AAOx3, CN Grossly intact  : Indwelling reagan in place  Skin: Normal temperature, warm, dry    MEDICATIONS  (STANDING):  ALBUTerol/ipratropium for Nebulization 3 milliLiter(s) Nebulizer every 4 hours  apixaban 2.5 milliGRAM(s) Oral every 12 hours  azithromycin   Tablet 250 milliGRAM(s) Oral daily  cefTRIAXone Injectable. 1000 milliGRAM(s) IV Push once  cefTRIAXone Injectable.      FLUoxetine 20 milliGRAM(s) Oral daily  metoprolol     tartrate 50 milliGRAM(s) Oral two times a day  OLANZapine 15 milliGRAM(s) Oral daily  simvastatin 40 milliGRAM(s) Oral at bedtime  sulfaSALAzine 500 milliGRAM(s) Oral four times a day  tamsulosin 0.4 milliGRAM(s) Oral at bedtime    MEDICATIONS  (PRN):  acetaminophen   Tablet 650 milliGRAM(s) Oral every 6 hours PRN For Temp greater than 38 C (100.4 F) or MILD PAIN (1-3)      Allergies    Thorazine (Hepatotoxicity)    Intolerances        LABS:                        11.2   14.3  )-----------( 201      ( 13 Feb 2018 08:27 )             35.8     02-13    143  |  106  |  24<H>  ----------------------------<  88  4.8   |  24  |  2.20<H>    Ca    9.2      13 Feb 2018 08:27  Mg     2.5     02-13    TPro  8.3<H>  /  Alb  3.4  /  TBili  0.3  /  DBili  x   /  AST  12<L>  /  ALT  15  /  AlkPhos  83  02-12      Urinalysis Basic - ( 12 Feb 2018 12:07 )    Color: Yellow / Appearance: Clear / SG: <=1.005 / pH: x  Gluc: x / Ketone: NEGATIVE  / Bili: NEGATIVE / Urobili: 0.2 E.U./dL   Blood: x / Protein: Trace mg/dL / Nitrite: NEGATIVE   Leuk Esterase: Trace / RBC: < 5 /HPF / WBC 5-10 /HPF   Sq Epi: x / Non Sq Epi: x / Bacteria: Present /HPF        RADIOLOGY & ADDITIONAL TESTS:

## 2018-02-13 NOTE — PROGRESS NOTE ADULT - SUBJECTIVE AND OBJECTIVE BOX
Patient is a 72y old  Male who presents with a chief complaint of cough (12 Feb 2018 21:12)      INTERVAL HPI/OVERNIGHT EVENTS:    Pt. seen and examined at 10:45AM  Pt. c/o occasional cough  Denies F/C, SOB, CP    Review of Systems: 12 point review of systems otherwise negative    MEDICATIONS  (STANDING):  ALBUTerol/ipratropium for Nebulization 3 milliLiter(s) Nebulizer every 4 hours  apixaban 2.5 milliGRAM(s) Oral every 12 hours  azithromycin   Tablet 250 milliGRAM(s) Oral daily  cefTRIAXone Injectable.      FLUoxetine 20 milliGRAM(s) Oral daily  metoprolol     tartrate 50 milliGRAM(s) Oral two times a day  OLANZapine 15 milliGRAM(s) Oral daily  simvastatin 40 milliGRAM(s) Oral at bedtime  sulfaSALAzine 500 milliGRAM(s) Oral four times a day  tamsulosin 0.4 milliGRAM(s) Oral at bedtime    MEDICATIONS  (PRN):  acetaminophen   Tablet 650 milliGRAM(s) Oral every 6 hours PRN For Temp greater than 38 C (100.4 F) or MILD PAIN (1-3)      Allergies    Thorazine (Hepatotoxicity)    Intolerances          Vital Signs Last 24 Hrs  T(C): 36.4 (13 Feb 2018 15:43), Max: 36.7 (13 Feb 2018 05:20)  T(F): 97.6 (13 Feb 2018 15:43), Max: 98 (13 Feb 2018 05:20)  HR: 73 (13 Feb 2018 15:43) (73 - 108)  BP: 157/96 (13 Feb 2018 15:43) (115/75 - 157/96)  BP(mean): --  RR: 18 (13 Feb 2018 15:43) (16 - 18)  SpO2: 96% (13 Feb 2018 15:43) (93% - 96%)  CAPILLARY BLOOD GLUCOSE          02-12 @ 07:01  -  02-13 @ 07:00  --------------------------------------------------------  IN: 0 mL / OUT: 3050 mL / NET: -3050 mL    02-13 @ 07:01 - 02-13 @ 16:55  --------------------------------------------------------  IN: 0 mL / OUT: 1450 mL / NET: -1450 mL        Physical Exam:  (at 10:45AM)  Daily     Daily   General:  non-toxic and well-appearing  HEENT:  MMM  CV: no JVD  Lungs:  CTA B/L, normal WOB on NC  Abdomen:  soft NT ND  Extremities:  chronic venous insufficiency skin changes   Skin:  WWP  :  +Nixon  Neuro:  AAOx3    LABS:                        11.2   14.3  )-----------( 201      ( 13 Feb 2018 08:27 )             35.8     02-13    143  |  106  |  24<H>  ----------------------------<  88  4.8   |  24  |  2.20<H>    Ca    9.2      13 Feb 2018 08:27  Mg     2.5     02-13    TPro  8.3<H>  /  Alb  3.4  /  TBili  0.3  /  DBili  x   /  AST  12<L>  /  ALT  15  /  AlkPhos  83  02-12      Urinalysis Basic - ( 12 Feb 2018 12:07 )    Color: Yellow / Appearance: Clear / SG: <=1.005 / pH: x  Gluc: x / Ketone: NEGATIVE  / Bili: NEGATIVE / Urobili: 0.2 E.U./dL   Blood: x / Protein: Trace mg/dL / Nitrite: NEGATIVE   Leuk Esterase: Trace / RBC: < 5 /HPF / WBC 5-10 /HPF   Sq Epi: x / Non Sq Epi: x / Bacteria: Present /HPF          RADIOLOGY & ADDITIONAL TESTS:    ---------------------------------------------------------------------------  I personally reviewed: [  ]EKG   [  ]CXR    [  ] CT    [  ]Other  ---------------------------------------------------------------------------  PLEASE CHECK WHEN PRESENT:     [  ]Heart Failure     [  ] Acute     [  ] Acute on Chronic     [  ] Chronic  -------------------------------------------------------------------     [  ]Diastolic [HFpEF]     [  ]Systolic [HFrEF]     [  ]Combined [HFpEF & HFrEF]     [  ]Other:  -------------------------------------------------------------------  [  ]DONNA     [  ]ATN     [  ]Reneal Medullary Necrosis     [  ]Renal Cortical Necrosis     [  ]Other Pathological Lesions:    [  ]CKD 1  [  ]CKD 2  [  ]CKD 3  [  ]CKD 4  [  ]CKD 5  [  ]Other  -------------------------------------------------------------------  [  ]Other/Unspecified:    --------------------------------------------------------------------    Abdominal Nutritional Status  [  ]Malnutrition: See Nutrition Note  [  ]Cachexia  [  ]Other:   [  ]Supplement Ordered:  [  ]Morbid Obesity (BMI >=40]

## 2018-02-14 ENCOUNTER — TRANSCRIPTION ENCOUNTER (OUTPATIENT)
Age: 73
End: 2018-02-14

## 2018-02-14 DIAGNOSIS — F31.76 BIPOLAR DISORDER, IN FULL REMISSION, MOST RECENT EPISODE DEPRESSED: ICD-10-CM

## 2018-02-14 DIAGNOSIS — R33.9 RETENTION OF URINE, UNSPECIFIED: ICD-10-CM

## 2018-02-14 LAB
ANION GAP SERPL CALC-SCNC: 11 MMOL/L — SIGNIFICANT CHANGE UP (ref 5–17)
BUN SERPL-MCNC: 24 MG/DL — HIGH (ref 7–23)
CALCIUM SERPL-MCNC: 9.5 MG/DL — SIGNIFICANT CHANGE UP (ref 8.4–10.5)
CHLORIDE SERPL-SCNC: 103 MMOL/L — SIGNIFICANT CHANGE UP (ref 96–108)
CO2 SERPL-SCNC: 28 MMOL/L — SIGNIFICANT CHANGE UP (ref 22–31)
CREAT SERPL-MCNC: 2.14 MG/DL — HIGH (ref 0.5–1.3)
GLUCOSE SERPL-MCNC: 86 MG/DL — SIGNIFICANT CHANGE UP (ref 70–99)
HCT VFR BLD CALC: 37.2 % — LOW (ref 39–50)
HGB BLD-MCNC: 11.6 G/DL — LOW (ref 13–17)
MAGNESIUM SERPL-MCNC: 2.4 MG/DL — SIGNIFICANT CHANGE UP (ref 1.6–2.6)
MCHC RBC-ENTMCNC: 27.8 PG — SIGNIFICANT CHANGE UP (ref 27–34)
MCHC RBC-ENTMCNC: 31.2 G/DL — LOW (ref 32–36)
MCV RBC AUTO: 89.2 FL — SIGNIFICANT CHANGE UP (ref 80–100)
PLATELET # BLD AUTO: 208 K/UL — SIGNIFICANT CHANGE UP (ref 150–400)
POTASSIUM SERPL-MCNC: 4.7 MMOL/L — SIGNIFICANT CHANGE UP (ref 3.5–5.3)
POTASSIUM SERPL-SCNC: 4.7 MMOL/L — SIGNIFICANT CHANGE UP (ref 3.5–5.3)
RBC # BLD: 4.17 M/UL — LOW (ref 4.2–5.8)
RBC # FLD: 14.3 % — SIGNIFICANT CHANGE UP (ref 10.3–16.9)
SODIUM SERPL-SCNC: 142 MMOL/L — SIGNIFICANT CHANGE UP (ref 135–145)
WBC # BLD: 9.5 K/UL — SIGNIFICANT CHANGE UP (ref 3.8–10.5)
WBC # FLD AUTO: 9.5 K/UL — SIGNIFICANT CHANGE UP (ref 3.8–10.5)

## 2018-02-14 PROCEDURE — 99233 SBSQ HOSP IP/OBS HIGH 50: CPT

## 2018-02-14 PROCEDURE — 90792 PSYCH DIAG EVAL W/MED SRVCS: CPT

## 2018-02-14 RX ORDER — SODIUM CHLORIDE 9 MG/ML
1000 INJECTION INTRAMUSCULAR; INTRAVENOUS; SUBCUTANEOUS
Qty: 0 | Refills: 0 | Status: DISCONTINUED | OUTPATIENT
Start: 2018-02-14 | End: 2018-02-15

## 2018-02-14 RX ORDER — TAMSULOSIN HYDROCHLORIDE 0.4 MG/1
1 CAPSULE ORAL
Qty: 30 | Refills: 0 | OUTPATIENT
Start: 2018-02-14 | End: 2018-03-15

## 2018-02-14 RX ORDER — CLONAZEPAM 1 MG
1 TABLET ORAL AT BEDTIME
Qty: 0 | Refills: 0 | Status: DISCONTINUED | OUTPATIENT
Start: 2018-02-14 | End: 2018-02-14

## 2018-02-14 RX ORDER — ONDANSETRON 8 MG/1
4 TABLET, FILM COATED ORAL ONCE
Qty: 0 | Refills: 0 | Status: COMPLETED | OUTPATIENT
Start: 2018-02-14 | End: 2018-02-14

## 2018-02-14 RX ORDER — CEFPODOXIME PROXETIL 100 MG
1 TABLET ORAL
Qty: 6 | Refills: 0 | OUTPATIENT
Start: 2018-02-14 | End: 2018-02-16

## 2018-02-14 RX ORDER — NIFEDIPINE 30 MG
90 TABLET, EXTENDED RELEASE 24 HR ORAL ONCE
Qty: 0 | Refills: 0 | Status: COMPLETED | OUTPATIENT
Start: 2018-02-14 | End: 2018-02-14

## 2018-02-14 RX ORDER — POLYETHYLENE GLYCOL 3350 17 G/17G
17 POWDER, FOR SOLUTION ORAL AT BEDTIME
Qty: 0 | Refills: 0 | Status: DISCONTINUED | OUTPATIENT
Start: 2018-02-14 | End: 2018-02-15

## 2018-02-14 RX ORDER — SENNA PLUS 8.6 MG/1
2 TABLET ORAL AT BEDTIME
Qty: 0 | Refills: 0 | Status: DISCONTINUED | OUTPATIENT
Start: 2018-02-14 | End: 2018-02-15

## 2018-02-14 RX ORDER — DOCUSATE SODIUM 100 MG
100 CAPSULE ORAL DAILY
Qty: 0 | Refills: 0 | Status: DISCONTINUED | OUTPATIENT
Start: 2018-02-14 | End: 2018-02-15

## 2018-02-14 RX ORDER — ONDANSETRON 8 MG/1
4 TABLET, FILM COATED ORAL EVERY 6 HOURS
Qty: 0 | Refills: 0 | Status: DISCONTINUED | OUTPATIENT
Start: 2018-02-14 | End: 2018-02-15

## 2018-02-14 RX ORDER — METOCLOPRAMIDE HCL 10 MG
10 TABLET ORAL ONCE
Qty: 0 | Refills: 0 | Status: COMPLETED | OUTPATIENT
Start: 2018-02-14 | End: 2018-02-14

## 2018-02-14 RX ORDER — CLONAZEPAM 1 MG
0.5 TABLET ORAL DAILY
Qty: 0 | Refills: 0 | Status: DISCONTINUED | OUTPATIENT
Start: 2018-02-14 | End: 2018-02-15

## 2018-02-14 RX ORDER — AZITHROMYCIN 500 MG/1
1 TABLET, FILM COATED ORAL
Qty: 3 | Refills: 0 | OUTPATIENT
Start: 2018-02-14 | End: 2018-02-16

## 2018-02-14 RX ADMIN — TAMSULOSIN HYDROCHLORIDE 0.4 MILLIGRAM(S): 0.4 CAPSULE ORAL at 00:10

## 2018-02-14 RX ADMIN — SIMVASTATIN 40 MILLIGRAM(S): 20 TABLET, FILM COATED ORAL at 00:10

## 2018-02-14 RX ADMIN — Medication 0.5 MILLIGRAM(S): at 09:25

## 2018-02-14 RX ADMIN — APIXABAN 2.5 MILLIGRAM(S): 2.5 TABLET, FILM COATED ORAL at 00:11

## 2018-02-14 RX ADMIN — Medication 3 MILLILITER(S): at 22:35

## 2018-02-14 RX ADMIN — Medication 20 MILLIGRAM(S): at 11:50

## 2018-02-14 RX ADMIN — Medication 500 MILLIGRAM(S): at 22:36

## 2018-02-14 RX ADMIN — APIXABAN 2.5 MILLIGRAM(S): 2.5 TABLET, FILM COATED ORAL at 22:36

## 2018-02-14 RX ADMIN — ONDANSETRON 4 MILLIGRAM(S): 8 TABLET, FILM COATED ORAL at 15:14

## 2018-02-14 RX ADMIN — SENNA PLUS 2 TABLET(S): 8.6 TABLET ORAL at 22:36

## 2018-02-14 RX ADMIN — OLANZAPINE 15 MILLIGRAM(S): 15 TABLET, FILM COATED ORAL at 11:50

## 2018-02-14 RX ADMIN — Medication 500 MILLIGRAM(S): at 11:50

## 2018-02-14 RX ADMIN — APIXABAN 2.5 MILLIGRAM(S): 2.5 TABLET, FILM COATED ORAL at 05:01

## 2018-02-14 RX ADMIN — TAMSULOSIN HYDROCHLORIDE 0.4 MILLIGRAM(S): 0.4 CAPSULE ORAL at 22:35

## 2018-02-14 RX ADMIN — POLYETHYLENE GLYCOL 3350 17 GRAM(S): 17 POWDER, FOR SOLUTION ORAL at 22:36

## 2018-02-14 RX ADMIN — Medication 50 MILLIGRAM(S): at 05:01

## 2018-02-14 RX ADMIN — Medication 3 MILLILITER(S): at 00:10

## 2018-02-14 RX ADMIN — Medication 50 MILLIGRAM(S): at 22:38

## 2018-02-14 RX ADMIN — CEFTRIAXONE 1000 MILLIGRAM(S): 500 INJECTION, POWDER, FOR SOLUTION INTRAMUSCULAR; INTRAVENOUS at 12:17

## 2018-02-14 RX ADMIN — Medication 10 MILLIGRAM(S): at 18:36

## 2018-02-14 RX ADMIN — Medication 90 MILLIGRAM(S): at 10:49

## 2018-02-14 RX ADMIN — Medication 500 MILLIGRAM(S): at 00:10

## 2018-02-14 RX ADMIN — Medication 500 MILLIGRAM(S): at 05:01

## 2018-02-14 RX ADMIN — AZITHROMYCIN 250 MILLIGRAM(S): 500 TABLET, FILM COATED ORAL at 11:52

## 2018-02-14 RX ADMIN — SIMVASTATIN 40 MILLIGRAM(S): 20 TABLET, FILM COATED ORAL at 22:36

## 2018-02-14 NOTE — DISCHARGE NOTE ADULT - HOSPITAL COURSE
73 yo M with PMHx of Afib (on eliquis 2.5mg BID), HTN, HLD, COPD (no home O2), never been intubated, abdominal aneurysm (~4cm one yr ago), Crohn's disease, schizophrenia, anxiety, BIBA from [facility] [place] pain. Found to be febrile in ED with leukocytosis and DONNA. Admitted for sepsis 2/2 PNA treated with CTX and azithromycin. Hospital course c/b urinary retention. Pt at first refusing intervention for urinary retention, and psych was called to deem pt capacity. After some explaining, the pt agreed for reagan placement and agreed to follow up with urology as an outpatient. Pt will be d/c with a reagan in place to follow up with urology in 1 week. Pt also does not have PMD at the moment, however receives his care at his own. Pt encouraged fo new PMD at will find referral through the home since he needs PNA follow up. Patient was medically optimized, stable, and ready for discharge. Plan of care and return precautions were discussed with the patient who verbally stated understanding. 71 yo M with PMHx of Afib (on eliquis 2.5mg BID), HTN, HLD, COPD (no home O2), never been intubated, abdominal aneurysm (~4cm one yr ago), Crohn's disease, schizophrenia, anxiety, BIBA from [facility] for chest pain. Found to be febrile in ED with leukocytosis and DNONA. Admitted for sepsis 2/2 PNA treated with CTX and azithromycin. Hospital course c/b urinary retention. Pt at first refusing intervention for urinary retention, and psych was called to deem pt capacity. After some explaining, the pt agreed for reagan placement and agreed to follow up with urology as an outpatient. Pt will be d/c with a reagan in place to follow up with urology in 1 week. Pt also does not have PMD at the moment, however receives his care at his home. Pt encouraged fo new PMD at will find referral through the home since he needs PNA follow up. Patient was medically optimized, stable, and ready for discharge. Plan of care and return precautions were discussed with the patient who verbally stated understanding.

## 2018-02-14 NOTE — PROGRESS NOTE ADULT - SUBJECTIVE AND OBJECTIVE BOX
Patient is a 72y old  Male who presents with a chief complaint of cough (14 Feb 2018 11:32)      INTERVAL HPI/OVERNIGHT EVENTS:    Pt. seen and examined at 11:30AM  Events noted; Pt. urinating s/p Nixon removal, but w/ residual urine on post-void bladder scan  Pt. refusing straight cath / Nixon re-insertion, d/t wanting to avoid  discomfort; Pt. verbalized understanding re: benefits of procedure and risks of refusal  Otherwise, Pt. feels better; c/o less cough and increased energy; no F/C, CP, SOB  c/o N/V later in the day after my visit; per report, he felt better afterwards, and tolerated P.O.    Review of Systems: 12 point review of systems otherwise negative    MEDICATIONS  (STANDING):  ALBUTerol/ipratropium for Nebulization 3 milliLiter(s) Nebulizer every 4 hours  apixaban 2.5 milliGRAM(s) Oral every 12 hours  azithromycin   Tablet 250 milliGRAM(s) Oral daily  cefTRIAXone Injectable. 1000 milliGRAM(s) IV Push every 24 hours  cefTRIAXone Injectable.      clonazePAM Tablet 0.5 milliGRAM(s) Oral daily  docusate sodium 100 milliGRAM(s) Oral daily  FLUoxetine 20 milliGRAM(s) Oral daily  metoprolol     tartrate 50 milliGRAM(s) Oral two times a day  OLANZapine 15 milliGRAM(s) Oral daily  polyethylene glycol 3350 17 Gram(s) Oral at bedtime  senna 2 Tablet(s) Oral at bedtime  simvastatin 40 milliGRAM(s) Oral at bedtime  sodium chloride 0.9%. 1000 milliLiter(s) (100 mL/Hr) IV Continuous <Continuous>  sulfaSALAzine 500 milliGRAM(s) Oral four times a day  tamsulosin 0.4 milliGRAM(s) Oral at bedtime    MEDICATIONS  (PRN):  acetaminophen   Tablet 650 milliGRAM(s) Oral every 6 hours PRN For Temp greater than 38 C (100.4 F) or MILD PAIN (1-3)  ondansetron    Tablet 4 milliGRAM(s) Oral every 6 hours PRN Nausea and/or Vomiting      Allergies    Thorazine (Hepatotoxicity)    Intolerances          Vital Signs Last 24 Hrs  T(C): 36.3 (14 Feb 2018 15:24), Max: 36.3 (14 Feb 2018 15:24)  T(F): 97.3 (14 Feb 2018 15:24), Max: 97.3 (14 Feb 2018 15:24)  HR: 86 (14 Feb 2018 15:24) (70 - 86)  BP: 147/99 (14 Feb 2018 15:24) (147/87 - 163/98)  BP(mean): --  RR: 18 (14 Feb 2018 15:24) (16 - 18)  SpO2: 93% (14 Feb 2018 15:24) (91% - 96%)  CAPILLARY BLOOD GLUCOSE          02-13 @ 07:01  -  02-14 @ 07:00  --------------------------------------------------------  IN: 0 mL / OUT: 3150 mL / NET: -3150 mL    02-14 @ 07:01  - 02-14 @ 17:13  --------------------------------------------------------  IN: 320 mL / OUT: 3100 mL / NET: -2780 mL        Physical Exam:  (at 11:30AM)  Daily     Daily   General:  well-appearing in NAD  HEENT:  MMM  CV:  irregular S1S2, no JVD  Lungs:  CTA B/L; normal WOB on RA  Abdomen:  soft NT ND  Extremities:  no edema B/L LE; +chronic venous stasis skin changes  Skin:  WWP  :  No Nixon  Neuro:  AAOx3, thought process linear     LABS:                        11.6   9.5   )-----------( 208      ( 14 Feb 2018 06:08 )             37.2     02-14    142  |  103  |  24<H>  ----------------------------<  86  4.7   |  28  |  2.14<H>    Ca    9.5      14 Feb 2018 06:08  Mg     2.4     02-14              RADIOLOGY & ADDITIONAL TESTS:    ---------------------------------------------------------------------------  I personally reviewed: [  ]EKG   [  ]CXR    [  ] CT    [  ]Other  ---------------------------------------------------------------------------  PLEASE CHECK WHEN PRESENT:     [  ]Heart Failure     [  ] Acute     [  ] Acute on Chronic     [  ] Chronic  -------------------------------------------------------------------     [  ]Diastolic [HFpEF]     [  ]Systolic [HFrEF]     [  ]Combined [HFpEF & HFrEF]     [  ]Other:  -------------------------------------------------------------------  [  ]DNONA     [  ]ATN     [  ]Reneal Medullary Necrosis     [  ]Renal Cortical Necrosis     [  ]Other Pathological Lesions:    [  ]CKD 1  [  ]CKD 2  [  ]CKD 3  [  ]CKD 4  [  ]CKD 5  [  ]Other  -------------------------------------------------------------------  [  ]Other/Unspecified:    --------------------------------------------------------------------    Abdominal Nutritional Status  [  ]Malnutrition: See Nutrition Note  [  ]Cachexia  [  ]Other:   [  ]Supplement Ordered:  [  ]Morbid Obesity (BMI >=40]

## 2018-02-14 NOTE — DISCHARGE NOTE ADULT - CARE PROVIDER_API CALL
Olaf Sarabia), Urology  [address], [place] [address]  Phone: [phone]  Fax: [phone]

## 2018-02-14 NOTE — BEHAVIORAL HEALTH ASSESSMENT NOTE - NSBHCONSULTMEDS_PSY_A_CORE FT
1. Continue home psychiatric meds as ordered  2. Clarify patient's baseline renal function  3. Patient has capacity to refuse straight cath at this time but would reassess if patient's clinical status deteriorates and he continues to refuse

## 2018-02-14 NOTE — DISCHARGE NOTE ADULT - PATIENT PORTAL LINK FT
You can access the [facility] Patient Portal, offered by [facility], by registering with the following website: [email or web address]

## 2018-02-14 NOTE — DISCHARGE NOTE ADULT - ADDITIONAL INSTRUCTIONS
Please follow up with Dr. Sarabia, the urologist, for assessment of your urinary retention.   Please maintain the reagan in place and try to follow up with the urologist in 1 week. Keep the reagan in place until you follow up with Dr. Sarabia. Please follow up with Dr. Sarabia, the urologist, for assessment of your urinary retention.   Please maintain the reagan in place and try to follow up with the urologist in 1 week. Keep the reagan in place until you follow up with Dr. Sarabia.    If you start to have increasing abdominal pain, problems with your reagan, blood in your reagan bag, then please alert the staff for further care. If the pain continues to worsen then return to the ED.

## 2018-02-14 NOTE — BEHAVIORAL HEALTH ASSESSMENT NOTE - NSBHCHARTREVIEWVS_PSY_A_CORE FT
Vital Signs Last 24 Hrs  T(C): 36.1 (14 Feb 2018 08:15), Max: 36.4 (13 Feb 2018 15:43)  T(F): 97 (14 Feb 2018 08:15), Max: 97.6 (13 Feb 2018 15:43)  HR: 79 (14 Feb 2018 08:15) (70 - 79)  BP: 158/85 (14 Feb 2018 08:15) (147/87 - 163/98)  BP(mean): --  RR: 16 (14 Feb 2018 08:15) (16 - 18)  SpO2: 96% (14 Feb 2018 08:15) (91% - 96%)

## 2018-02-14 NOTE — PROGRESS NOTE ADULT - PROBLEM SELECTOR PLAN 9
DVT: on eliquis    #FEN  -F: no IVF  -Replete lytes prn.  -Regular diet.    #CODE STATUS   -FULL CODE

## 2018-02-14 NOTE — CHART NOTE - NSCHARTNOTEFT_GEN_A_CORE
Pt is still retaining urine after slight urination >400cc. Denies any further catheterization or reagan placement. Pt was explained the risks of urinary rentention, including further retention, pain, possible bladder rupture, backflow to kidney, increased infection possibility and still denies further intervention. Pt wants to go home and will deal with the problem once he is no longer urinating. Psych was consulted and deemed the patient has capacity. Urology will follow the patient as an outpatient as well.

## 2018-02-14 NOTE — CHART NOTE - NSCHARTNOTEFT_GEN_A_CORE
Patient had about 400cc of post void urine on bladder scan. Patient is refusing straight cath. Had extensive discussion with patient regarding the risks of urine backing up and damaging the kidneys. Patient acknowledges risks, but says that his entire body is messed up and doesn't believe anything will happen to his kidneys. Patient does not believe that straight catheterization will . Patient educated why we attempt straight catheterization before attempting medications or other procedures, but patient says he does not want to discuss it any further and would like to be left alone.

## 2018-02-14 NOTE — BEHAVIORAL HEALTH ASSESSMENT NOTE - SUMMARY
72M h/o bipolar disorder, living in mental health residence, presenting with CP, now resolved, found to have PNA and urinary retention, refusing straight cath.  Patient is consistent in his refusal and can state the risks and benefits of refusing catheterization.  He clearly indicates that he does not want to die presently and is not suicidal, and would agree to catheterization if he stopped urinating or his situation worsened.  Per outpatient psychiatrist no concern for recent psychiatric decompensation.  Also patient and psychiatrist reporting history of CKD which would explain at least some of patient's currently elevated Cr.

## 2018-02-14 NOTE — DISCHARGE NOTE ADULT - MEDICATION SUMMARY - MEDICATIONS TO TAKE
I will START or STAY ON the medications listed below when I get home from the hospital:    Azulfidine 500 mg oral tablet  -- 1 tab(s) by mouth 4 times a day  -- Indication: For Crohn disease    tamsulosin 0.4 mg oral capsule  -- 1 cap(s) by mouth once a day (at bedtime)  -- Indication: For BPH (benign prostatic hyperplasia)    Eliquis 2.5 mg oral tablet  -- 1 tab(s) by mouth 2 times a day  -- Indication: For Atrial fibrillation, unspecified type    clonazePAM 0.5 mg oral tablet  -- 1 tab(s) by mouth once a day  -- Indication: For Schizophrenia    clonazePAM 1 mg oral tablet  -- 1 tab(s) by mouth once a day (at bedtime)  -- Indication: For Schizophrenia    PROzac 20 mg oral capsule  -- 1 cap(s) by mouth once a day  -- Indication: For Schizophrenia    simvastatin 40 mg oral tablet  -- 1 tab(s) by mouth once a day (at bedtime)  -- Indication: For Hyperlipidemia    ZyPREXA 15 mg oral tablet  -- 1 tab(s) by mouth once a day  -- Indication: For Schizophrenia    Lopressor 100 mg oral tablet  -- 1 tab(s) by mouth 2 times a day  -- Indication: For Atrial fibrillation    Ventolin 90 mcg/inh inhalation aerosol with adapter  -- 1 puff(s) inhaled every 6 hours, As Needed  -- Indication: For COPD (chronic obstructive pulmonary disease)    Atrovent HFA 17 mcg/inh inhalation aerosol  -- 1 puff(s) inhaled every 6 hours, As Needed  -- Indication: For COPD (chronic obstructive pulmonary disease)    Nifedical XL  -- 90 milligram(s) by mouth once a day  -- Indication: For Hypertension    cefpodoxime 200 mg oral tablet  -- 1 tab(s) by mouth 2 times a day x 3 days   -- Finish all this medication unless otherwise directed by prescriber.  Take with food or milk.    -- Indication: For Pneumonia    azithromycin 250 mg oral tablet  -- 1 tab(s) by mouth once a day  -- Indication: For Pneumonia    Vitamin D3 1000 intl units oral capsule  -- 1 cap(s) by mouth once a day  -- Indication: For Prophylactic measure

## 2018-02-14 NOTE — DISCHARGE NOTE ADULT - MEDICATION SUMMARY - MEDICATIONS TO STOP TAKING
I will STOP taking the medications listed below when I get home from the hospital:    terazosin  -- 3 milligram(s) by mouth once a day (at bedtime)    losartan 100 mg oral tablet  -- 1 tab(s) by mouth once a day

## 2018-02-14 NOTE — PROGRESS NOTE ADULT - SUBJECTIVE AND OBJECTIVE BOX
INTERVAL HPI/OVERNIGHT EVENTS:  Patient was seen and examined at bedside. As per nurse and patient, no o/n events, patient resting comfortably. No complaints at this time. Patient denies: fever, chills, dizziness, weakness, HA, Changes in vision, CP, palpitations, SOB, cough, N/V/D/C, dysuria, changes in bowel movements, LE edema.     In the afternoon, the patient started to have episodes of nausea and vomiting. He had a bowel movement in bed he could not control. Then he had one episode of vomiting and stood up to go to the bathroom. He became dizzy however got to the bathroom in time and was found sitting on the toilet. He then was taken back to his bed and had an additional episode of vomiting. After the episode of vomiting, the patient felt better. I assessed the pt at bedside. He no longer     VITAL SIGNS:  T(F): 97 (02-14-18 @ 08:15)  HR: 79 (02-14-18 @ 08:15)  BP: 158/85 (02-14-18 @ 08:15)  RR: 16 (02-14-18 @ 08:15)  SpO2: 96% (02-14-18 @ 08:15)  Wt(kg): --    PHYSICAL EXAM:    Constitutional: WDWN, NAD  Eyes: PERRL, EOMI, sclera non-icteric  Neck: supple, trachea midline, no masses, no JVD  Respiratory: CTA b/l, good air entry b/l, no wheezing, no rhonchi, no rales, without accessory muscle use and no intercostal retractions  Cardiovascular: RRR, normal S1S2, no M/R/G  Gastrointestinal: soft, NTND, no masses palpable, BS normal  Extremities: Warm, well perfused, pulses equal bilateral upper and lower extremities, no edema, no clubbing  Neurological: AAOx3, CN Grossly intact  Skin: Normal temperature, warm, dry    MEDICATIONS  (STANDING):  ALBUTerol/ipratropium for Nebulization 3 milliLiter(s) Nebulizer every 4 hours  apixaban 2.5 milliGRAM(s) Oral every 12 hours  azithromycin   Tablet 250 milliGRAM(s) Oral daily  cefTRIAXone Injectable. 1000 milliGRAM(s) IV Push every 24 hours  cefTRIAXone Injectable.      clonazePAM Tablet 0.5 milliGRAM(s) Oral daily  clonazePAM Tablet 1 milliGRAM(s) Oral at bedtime  FLUoxetine 20 milliGRAM(s) Oral daily  metoprolol     tartrate 50 milliGRAM(s) Oral two times a day  OLANZapine 15 milliGRAM(s) Oral daily  simvastatin 40 milliGRAM(s) Oral at bedtime  sulfaSALAzine 500 milliGRAM(s) Oral four times a day  tamsulosin 0.4 milliGRAM(s) Oral at bedtime    MEDICATIONS  (PRN):  acetaminophen   Tablet 650 milliGRAM(s) Oral every 6 hours PRN For Temp greater than 38 C (100.4 F) or MILD PAIN (1-3)  ondansetron Injectable 4 milliGRAM(s) IV Push once PRN Nausea and/or Vomiting      Allergies    Thorazine (Hepatotoxicity)    Intolerances        LABS:                        11.6   9.5   )-----------( 208      ( 14 Feb 2018 06:08 )             37.2     02-14    142  |  103  |  24<H>  ----------------------------<  86  4.7   |  28  |  2.14<H>    Ca    9.5      14 Feb 2018 06:08  Mg     2.4     02-14            RADIOLOGY & ADDITIONAL TESTS: INTERVAL HPI/OVERNIGHT EVENTS:  Patient was seen and examined at bedside. As per nurse and patient, no o/n events, patient resting comfortably. No complaints at this time. Patient denies: fever, chills, dizziness, weakness, HA, Changes in vision, CP, palpitations, N/V/D/C, dysuria, changes in bowel movements, LE edema.     In the afternoon, the patient started to have episodes of nausea and vomiting. He had a bowel movement in bed he could not control. Then he had one episode of vomiting and stood up to go to the bathroom. He became dizzy however got to the bathroom in time and was found sitting on the toilet. He then was taken back to his bed and had an additional episode of vomiting. After the episode of vomiting, the patient felt better. I assessed the pt at bedside. He no longer has nausea or feelings of vomiting. He tolerated drinking apple juice and water at bedside. Pt is amenable to straight cath now if he does not urinate soon.    VITAL SIGNS:  T(F): 97 (02-14-18 @ 08:15)  HR: 79 (02-14-18 @ 08:15)  BP: 158/85 (02-14-18 @ 08:15)  RR: 16 (02-14-18 @ 08:15)  SpO2: 96% (02-14-18 @ 08:15)  Wt(kg): --    PHYSICAL EXAM:      Constitutional: WDWN, NAD  Eyes: PERRL, EOMI, sclera non-icteric  Neck: supple, trachea midline, no masses, no JVD  Respiratory: CTAB, no rhonchi, no rales, without accessory muscle use and no intercostal retractions  Cardiovascular: irregularly irregular, normal S1S2, no M/R/G  Gastrointestinal: soft, NTND, no masses palpable, BS normal  Extremities: Warm, well perfused, pulses equal bilateral upper and lower extremities, 2+ LLE edema with chronic venous changes present, RLE with trace edema  Neurological: AAOx3, CN Grossly intact  Skin: Normal temperature, warm, dry    MEDICATIONS  (STANDING):  ALBUTerol/ipratropium for Nebulization 3 milliLiter(s) Nebulizer every 4 hours  apixaban 2.5 milliGRAM(s) Oral every 12 hours  azithromycin   Tablet 250 milliGRAM(s) Oral daily  cefTRIAXone Injectable. 1000 milliGRAM(s) IV Push every 24 hours  cefTRIAXone Injectable.      clonazePAM Tablet 0.5 milliGRAM(s) Oral daily  clonazePAM Tablet 1 milliGRAM(s) Oral at bedtime  FLUoxetine 20 milliGRAM(s) Oral daily  metoprolol     tartrate 50 milliGRAM(s) Oral two times a day  OLANZapine 15 milliGRAM(s) Oral daily  simvastatin 40 milliGRAM(s) Oral at bedtime  sulfaSALAzine 500 milliGRAM(s) Oral four times a day  tamsulosin 0.4 milliGRAM(s) Oral at bedtime    MEDICATIONS  (PRN):  acetaminophen   Tablet 650 milliGRAM(s) Oral every 6 hours PRN For Temp greater than 38 C (100.4 F) or MILD PAIN (1-3)  ondansetron Injectable 4 milliGRAM(s) IV Push once PRN Nausea and/or Vomiting      Allergies    Thorazine (Hepatotoxicity)    Intolerances        LABS:                        11.6   9.5   )-----------( 208      ( 14 Feb 2018 06:08 )             37.2     02-14    142  |  103  |  24<H>  ----------------------------<  86  4.7   |  28  |  2.14<H>    Ca    9.5      14 Feb 2018 06:08  Mg     2.4     02-14            RADIOLOGY & ADDITIONAL TESTS:

## 2018-02-14 NOTE — DISCHARGE NOTE ADULT - CARE PLAN
Principal Discharge DX:	Pneumonia  Goal:	Discharge to [facility]. To treat the infection.  Assessment and plan of treatment:	You presented to the hospital with chest pain and shortness of breath.  Secondary Diagnosis:	Chest pain  Secondary Diagnosis:	Atrial fibrillation  Secondary Diagnosis:	Acute kidney injury  Secondary Diagnosis:	BPH (benign prostatic hyperplasia)  Secondary Diagnosis:	COPD (chronic obstructive pulmonary disease)  Secondary Diagnosis:	Schizophrenia Principal Discharge DX:	Pneumonia  Goal:	Discharge to [facility]. To treat the infection.  Assessment and plan of treatment:	You presented to the hospital with chest pain and shortness of breath. On arrival to the hospital,  Secondary Diagnosis:	Chest pain  Secondary Diagnosis:	Atrial fibrillation  Secondary Diagnosis:	Acute kidney injury  Secondary Diagnosis:	COPD (chronic obstructive pulmonary disease)  Secondary Diagnosis:	Schizophrenia  Secondary Diagnosis:	Urinary retention Principal Discharge DX:	Pneumonia  Goal:	Discharge to [facility]. To treat the infection.  Assessment and plan of treatment:	You presented to the hospital with chest pain and shortness of breath. On arrival to the hospital, a chest xray was performed and a consolidation was noted, which is likely pneumonia. You were treated for community acquired pneumonia with azithromycin and ceftriaxone. Please continue to take your antibiotics as prescribed for a total of five days of therapy to end 2/17.  Secondary Diagnosis:	Chest pain  Goal:	Resolved.  Assessment and plan of treatment:	You presented to the hospital  Secondary Diagnosis:	Atrial fibrillation  Secondary Diagnosis:	Acute kidney injury  Secondary Diagnosis:	COPD (chronic obstructive pulmonary disease)  Secondary Diagnosis:	Schizophrenia  Secondary Diagnosis:	Urinary retention Principal Discharge DX:	Pneumonia  Goal:	Discharge to [facility]. To treat the infection.  Assessment and plan of treatment:	You presented to the hospital with chest pain and shortness of breath. On arrival to the hospital, a chest xray was performed and a consolidation was noted, which is likely pneumonia. You were treated for community acquired pneumonia with azithromycin and ceftriaxone. Please continue to take your antibiotics as prescribed for a total of five days of therapy to end 2/17. Please follow up with a primary care doctor in 1-2 weeks for post hospitalization management.  Secondary Diagnosis:	Chest pain  Goal:	Resolved.  Assessment and plan of treatment:	You presented to the hospital with chest pain that resolved prior to your arrival. At the hospital, and ekg was done that ruled out any changes and your cardiac enzymes were negative. You remained pain free during the rest of your admission. Please follow up with your primary care doctor for further workup. If the chest pain persists, then please return to the ED.  Secondary Diagnosis:	Atrial fibrillation  Goal:	Stable.  Assessment and plan of treatment:	Continue with your home medications.  Secondary Diagnosis:	Acute kidney injury  Goal:	Stable.  Assessment and plan of treatment:	You presented with a worsening of your kidney function. This was likely due to obstruction preventing your bladder to empty. As a reagan was placed and you received fluids, your kidney function improved. On discharge, your Cr was 1.99. Please keep the reagan in place until you follow up with urology as noted below.  Secondary Diagnosis:	COPD (chronic obstructive pulmonary disease)  Goal:	Stable.  Assessment and plan of treatment:	Please continue with your home medications.  Secondary Diagnosis:	Schizophrenia  Goal:	Stable.  Assessment and plan of treatment:	Please continue with your home medications as prescribed. Please follow up with your psychiatrist in the home.  Secondary Diagnosis:	Urinary retention  Goal:	Resolving.  Assessment and plan of treatment:	You presented to the hospital with urinary retention. You required catheterization and reagan placement due to the inability to empty your bladder. You were started on flomax to help alleviate some pressure from your prostate. Please maintain the reagan in place and follow up with urology in 1 week for further workup.

## 2018-02-14 NOTE — BEHAVIORAL HEALTH ASSESSMENT NOTE - NSBHCHARTREVIEWLAB_PSY_A_CORE FT
11.6   9.5   )-----------( 208      ( 14 Feb 2018 06:08 )             37.2   02-14    142  |  103  |  24<H>  ----------------------------<  86  4.7   |  28  |  2.14<H>    Ca    9.5      14 Feb 2018 06:08  Mg     2.4     02-14

## 2018-02-14 NOTE — BEHAVIORAL HEALTH ASSESSMENT NOTE - HPI (INCLUDE ILLNESS QUALITY, SEVERITY, DURATION, TIMING, CONTEXT, MODIFYING FACTORS, ASSOCIATED SIGNS AND SYMPTOMS)
72M PPH bipolar disorder, multiple prior psych admissions (none recently), living in psychiatric residence [facility] afib, HTN, COPD, CKD, BIB self for chest pain, found to have PNA.  Patient reporting increased urinary frequency, started on Flomax, found to be retaining urine following TOV yesterday, recommendation to straight cath but patient declining.  Psychiatry consulted to assess capacity to refuse straight cath.    Patient seen at bedside.  Reports that has many chronic medical issues and "my body is falling apart."  Says that he has been having mor etrouble walking and spends a lot of the day at home lying in bed or watching TV.  Reports he has been told that he has kidney disease but was told 4 monhts ago that it has been stable for the las tyear by his nephrologist Dr. Cuellar.  Is aware that bladder scan today showed residual urine and that recommendation is to get straight cath, but does not want cath as "I don't want them messing around down there again" and says he is still urinating so does not believe it would be acutely dangerous to go without straight cath.  Says that if he stopped urinating completely he was told he would go into renal failure and a coma within 36 hours and in that case he would accept catheterization because he does not want to die acutely.  Does believe that due to his medical problems he could die within the next few years but denies active or passive suicidality.  Denies past or present AH/VH.  Denies past or present manic symptoms.  Says he has "schizophrenia" and it is difficult for him to describe the symptoms of mental illness he has exactly.    Spoke to patient's psychiatrist at residence Dr. Chacko who has known patient for 20 years.  She reports he has a diagnosis of bipolar disorder and corroborates baseline renal impairment.  Patient was previously on lithium for many years which was discontinued due to renal impairment.  Now stable on Prozac, Zyprexa, and klonopin.  Dr. Chacko reports patient has some element of chronic "hopelessness" and she is not surprised at his presentation but has not had any concerns that he needs a psych admission or has decompensated from baseline.

## 2018-02-14 NOTE — BEHAVIORAL HEALTH ASSESSMENT NOTE - NSBHSOCIALHXDETAILSFT_PSY_A_CORE
Born and raised in [place].  Has lived at [place] for 30 years.  Parents .  Has two brothers- one lives in [place] and visits him every few months (Joey) and one lives in [facility] and he does not see regularly (Jeremy)- does not feel close to them and doesn't want them called.

## 2018-02-14 NOTE — BEHAVIORAL HEALTH ASSESSMENT NOTE - OTHER PAST PSYCHIATRIC HISTORY (INCLUDE DETAILS REGARDING ONSET, COURSE OF ILLNESS, INPATIENT/OUTPATIENT TREATMENT)
Reports multiple prior psych admits although psychiatrist from residence Dr. Chacko does not remember any admissions since she has known him (20 years).  On Klonopin, Zyprexa, Prozac.  Denies history of suicide attempt.

## 2018-02-14 NOTE — BEHAVIORAL HEALTH ASSESSMENT NOTE - NSBHADMITCOUNSEL_PSY_A_CORE
diagnostic results/impressions and/or recommended studies/risks and benefits of treatment options/importance of adherence to chosen treatment/client/family/caregiver education/prognosis/instructions for management, treatment and follow up/risk factor reduction

## 2018-02-14 NOTE — DISCHARGE NOTE ADULT - PLAN OF CARE
Discharge to [facility]. To treat the infection. You presented to the hospital with chest pain and shortness of breath. You presented to the hospital with chest pain and shortness of breath. On arrival to the hospital, You presented to the hospital with chest pain and shortness of breath. On arrival to the hospital, a chest xray was performed and a consolidation was noted, which is likely pneumonia. You were treated for community acquired pneumonia with azithromycin and ceftriaxone. Please continue to take your antibiotics as prescribed for a total of five days of therapy to end 2/17. Resolved. You presented to the hospital You presented to the hospital with chest pain and shortness of breath. On arrival to the hospital, a chest xray was performed and a consolidation was noted, which is likely pneumonia. You were treated for community acquired pneumonia with azithromycin and ceftriaxone. Please continue to take your antibiotics as prescribed for a total of five days of therapy to end 2/17. Please follow up with a primary care doctor in 1-2 weeks for post hospitalization management. You presented to the hospital with chest pain that resolved prior to your arrival. At the hospital, and ekg was done that ruled out any changes and your cardiac enzymes were negative. You remained pain free during the rest of your admission. Please follow up with your primary care doctor for further workup. If the chest pain persists, then please return to the ED. Stable. Continue with your home medications. You presented with a worsening of your kidney function. This was likely due to obstruction preventing your bladder to empty. As a reagan was placed and you received fluids, your kidney function improved. On discharge, your Cr was 1.99. Please keep the reagan in place until you follow up with urology as noted below. Please continue with your home medications. Please continue with your home medications as prescribed. Please follow up with your psychiatrist in the home. Resolving. You presented to the hospital with urinary retention. You required catheterization and reagan placement due to the inability to empty your bladder. You were started on flomax to help alleviate some pressure from your prostate. Please maintain the reagan in place and follow up with urology in 1 week for further workup.

## 2018-02-14 NOTE — BEHAVIORAL HEALTH ASSESSMENT NOTE - NSBHCHARTREVIEWIMAGING_PSY_A_CORE FT
retroperitoneal U/S 2/13    IMPRESSION:  Moderately increased renal echogenicity consistent with medical renal   disease.  Mild right hydronephrosis.

## 2018-02-14 NOTE — DISCHARGE NOTE ADULT - SECONDARY DIAGNOSIS.
Chest pain Atrial fibrillation Acute kidney injury BPH (benign prostatic hyperplasia) COPD (chronic obstructive pulmonary disease) Schizophrenia Urinary retention

## 2018-02-15 VITALS — OXYGEN SATURATION: 90 % | HEART RATE: 85 BPM | SYSTOLIC BLOOD PRESSURE: 122 MMHG | DIASTOLIC BLOOD PRESSURE: 81 MMHG

## 2018-02-15 LAB
ANION GAP SERPL CALC-SCNC: 11 MMOL/L — SIGNIFICANT CHANGE UP (ref 5–17)
BUN SERPL-MCNC: 23 MG/DL — SIGNIFICANT CHANGE UP (ref 7–23)
CALCIUM SERPL-MCNC: 9.8 MG/DL — SIGNIFICANT CHANGE UP (ref 8.4–10.5)
CHLORIDE SERPL-SCNC: 103 MMOL/L — SIGNIFICANT CHANGE UP (ref 96–108)
CO2 SERPL-SCNC: 29 MMOL/L — SIGNIFICANT CHANGE UP (ref 22–31)
CREAT SERPL-MCNC: 1.99 MG/DL — HIGH (ref 0.5–1.3)
GLUCOSE SERPL-MCNC: 94 MG/DL — SIGNIFICANT CHANGE UP (ref 70–99)
HCT VFR BLD CALC: 37.2 % — LOW (ref 39–50)
HGB BLD-MCNC: 11.8 G/DL — LOW (ref 13–17)
MCHC RBC-ENTMCNC: 28.1 PG — SIGNIFICANT CHANGE UP (ref 27–34)
MCHC RBC-ENTMCNC: 31.7 G/DL — LOW (ref 32–36)
MCV RBC AUTO: 88.6 FL — SIGNIFICANT CHANGE UP (ref 80–100)
PLATELET # BLD AUTO: 194 K/UL — SIGNIFICANT CHANGE UP (ref 150–400)
POTASSIUM SERPL-MCNC: 4.1 MMOL/L — SIGNIFICANT CHANGE UP (ref 3.5–5.3)
POTASSIUM SERPL-SCNC: 4.1 MMOL/L — SIGNIFICANT CHANGE UP (ref 3.5–5.3)
RBC # BLD: 4.2 M/UL — SIGNIFICANT CHANGE UP (ref 4.2–5.8)
RBC # FLD: 14.1 % — SIGNIFICANT CHANGE UP (ref 10.3–16.9)
SODIUM SERPL-SCNC: 143 MMOL/L — SIGNIFICANT CHANGE UP (ref 135–145)
WBC # BLD: 8.4 K/UL — SIGNIFICANT CHANGE UP (ref 3.8–10.5)
WBC # FLD AUTO: 8.4 K/UL — SIGNIFICANT CHANGE UP (ref 3.8–10.5)

## 2018-02-15 PROCEDURE — 99239 HOSP IP/OBS DSCHRG MGMT >30: CPT

## 2018-02-15 RX ADMIN — Medication 50 MILLIGRAM(S): at 05:30

## 2018-02-15 RX ADMIN — Medication 20 MILLIGRAM(S): at 12:13

## 2018-02-15 RX ADMIN — Medication 50 MILLIGRAM(S): at 17:12

## 2018-02-15 RX ADMIN — Medication 500 MILLIGRAM(S): at 05:30

## 2018-02-15 RX ADMIN — Medication 100 MILLIGRAM(S): at 12:13

## 2018-02-15 RX ADMIN — Medication 3 MILLILITER(S): at 10:01

## 2018-02-15 RX ADMIN — CEFTRIAXONE 1000 MILLIGRAM(S): 500 INJECTION, POWDER, FOR SOLUTION INTRAMUSCULAR; INTRAVENOUS at 10:01

## 2018-02-15 RX ADMIN — Medication 3 MILLILITER(S): at 05:30

## 2018-02-15 RX ADMIN — APIXABAN 2.5 MILLIGRAM(S): 2.5 TABLET, FILM COATED ORAL at 17:12

## 2018-02-15 RX ADMIN — OLANZAPINE 15 MILLIGRAM(S): 15 TABLET, FILM COATED ORAL at 12:13

## 2018-02-15 RX ADMIN — Medication 500 MILLIGRAM(S): at 02:36

## 2018-02-15 RX ADMIN — Medication 3 MILLILITER(S): at 15:20

## 2018-02-15 RX ADMIN — Medication 500 MILLIGRAM(S): at 12:13

## 2018-02-15 RX ADMIN — AZITHROMYCIN 250 MILLIGRAM(S): 500 TABLET, FILM COATED ORAL at 12:12

## 2018-02-15 RX ADMIN — Medication 3 MILLILITER(S): at 17:12

## 2018-02-15 RX ADMIN — Medication 0.5 MILLIGRAM(S): at 12:13

## 2018-02-15 RX ADMIN — APIXABAN 2.5 MILLIGRAM(S): 2.5 TABLET, FILM COATED ORAL at 05:30

## 2018-02-15 NOTE — PROGRESS NOTE ADULT - PROBLEM SELECTOR PROBLEM 8
Crohn's disease with complication, unspecified gastrointestinal tract location
Bipolar disorder, in full remission, most recent episode depressed
Bipolar disorder, in full remission, most recent episode depressed
Abdominal aneurysm
Abdominal aneurysm

## 2018-02-15 NOTE — PROGRESS NOTE ADULT - PROBLEM SELECTOR PLAN 8
cont. sulfasalazine
appreciate Psych recs; stable on rx; cont. Zyprexa, Klonopin, SSRI
appreciate Psych recs; stable on rx; cont. Zyprexa, Klonopin, SSRI
4 cm dx 1 year ago. No abx pain. HD stable now.   -Serial abd exams  -Outpatient followup abd US.
4 cm dx 1 year ago. No abx pain. HD stable now.   -Serial abd exams  -Outpatient followup abd US.

## 2018-02-15 NOTE — PROGRESS NOTE ADULT - PROBLEM SELECTOR PLAN 5
cont. A/A nebs
Nifedipine restarted; holding ARB as above
Nifedipine restarted; holding ARB as above
-Shannon standing for now.  -Smoking cessation counseling.
-Shannon standing for now.  -Smoking cessation counseling.

## 2018-02-15 NOTE — PROGRESS NOTE ADULT - ASSESSMENT
71 y/o M w/
71 y/o M w/
71 yo M with PMHx of Afib (on eliquis 2.5mg BID), HTN, HLD, COPD (no home O2), never been intubated, abdominal aneurysm (~4cm one yr ago), Crohn's disease, schizophrenia, anxiety, BIBA from [facility] for chest pain. Found to be febrile in ED with leukocytosis and DONNA. Admitted for sepsis 2/2 PNA
73 yo M with PMHx of Afib (on eliquis 2.5mg BID), HTN, HLD, COPD (no home O2), never been intubated, abdominal aneurysm (~4cm one yr ago), Crohn's disease, schizophrenia, anxiety, BIBA from [facility] for chest pain. Found to be febrile in ED with leukocytosis and DONNA. Admitted for sepsis 2/2 PNA
73 y/o M w/

## 2018-02-15 NOTE — PHYSICAL THERAPY INITIAL EVALUATION ADULT - PERTINENT HX OF CURRENT PROBLEM, REHAB EVAL
As per chart: 73 yo M with PMHx of Afib (on eliquis 2.5mg BID), HTN, HLD, COPD (no home O2), never been intubated, abdominal aneurysm (~4cm one yr ago), Crohn's disease, schizophrenia, anxiety, BIBA from [facility] for CP that was sharp, substernal, lasting 2 hours, and self resolving. Patient reports chills for about a week

## 2018-02-15 NOTE — PROGRESS NOTE ADULT - PROBLEM SELECTOR PROBLEM 7
Anxiety and depression
Crohn's disease with complication, unspecified gastrointestinal tract location
Crohn's disease with complication, unspecified gastrointestinal tract location
Hypertension
Hypertension

## 2018-02-15 NOTE — PROGRESS NOTE ADULT - PROBLEM SELECTOR PROBLEM 5
Chronic obstructive pulmonary disease, unspecified COPD type
Essential hypertension
Essential hypertension
COPD (chronic obstructive pulmonary disease)
COPD (chronic obstructive pulmonary disease)

## 2018-02-15 NOTE — PROGRESS NOTE ADULT - PROBLEM SELECTOR PLAN 4
cont. beta-blocker, A/C w/ DOAC
cont. beta-blocker, A/C w/ DOAC
FeNA most likely post renal.  - did improve after receiving fluids in ED   - unsure what baseline Cr function is   - like postobstructive uropathy from BPH, however has never been diagnosed  - f/u RP US - mild R hydronephrosis  - c/w flomax   - monitor UOP  - TOV was attempted, pt post void residual is 250-300cc, agreeable to straight cath if needed
FeNA most likely post renal. Nixon placed in ED due to incontinence   - did improve after receiving fludis in ED from 2.6 to 2.2 however no hx of CKD in the past   - like postobstructive uropathy from BPH, however has never been diagnosed  - f/u RP US   - c/w flomax   - monitor UOP
holding BP rx in setting of sepsis; restart as BP allows

## 2018-02-15 NOTE — PROGRESS NOTE ADULT - PROBLEM SELECTOR PLAN 3
on CKD3.; monitor BMP, renally-dose rx, holding ARB
on CKD3.; monitor BMP, renally-dose rx, holding ARB
Was in RVR. Rate controlled now.   -continue 1/2 home dose loprssor to prevent afib w/ rvr given SIRS.  -C/w home eliquis.
Was in RVR. Rate controlled now.   -continue 1/2 home dose loprssor to prevent afib w/ rvr given SIRS.  -C/w home eliquis.
cont. beta-blocker, A/C w/ DOAC

## 2018-02-15 NOTE — PROGRESS NOTE ADULT - PROBLEM SELECTOR PLAN 7
cont. SSRI
cont. sulfasalazine
cont. sulfasalazine
- Restarted nifedipine 90mg QD  - still holding pt home losartan in setting of DONNA
-Holding anti-htn in setting of SIRS except 1/2 home dose Lopressor to prevent rebound tachycardia.

## 2018-02-15 NOTE — PROGRESS NOTE ADULT - PROBLEM SELECTOR PROBLEM 6
Schizophrenia, unspecified type
Chronic obstructive pulmonary disease, unspecified COPD type
Chronic obstructive pulmonary disease, unspecified COPD type
Crohn disease
Crohn disease

## 2018-02-15 NOTE — PROGRESS NOTE ADULT - ATTENDING COMMENTS
Dispo: anticipate d/c home tomorrow pending clinical progress  TOV
Dispo: anticipate d/c back to residence tomorrow, pending clinical progress, PT eval, ambulatory O2 assessment
Dispo: d/c planning  will go w/ T[facility]

## 2018-02-15 NOTE — PROGRESS NOTE ADULT - PROBLEM SELECTOR PROBLEM 1
Sepsis due to pneumonia
Sepsis due to pneumonia
SIRS (systemic inflammatory response syndrome)
Sepsis due to pneumonia
Sepsis due to pneumonia

## 2018-02-15 NOTE — PROGRESS NOTE ADULT - PROBLEM SELECTOR PROBLEM 4
Atrial fibrillation, unspecified type
Atrial fibrillation, unspecified type
Acute kidney injury
Acute kidney injury
Essential hypertension

## 2018-02-15 NOTE — PROGRESS NOTE ADULT - SUBJECTIVE AND OBJECTIVE BOX
Patient is a 72y old  Male who presents with a chief complaint of cough (14 Feb 2018 11:32)      INTERVAL HPI/OVERNIGHT EVENTS:    Pt. seen and examined at 10:30AM  Pt. feels well today; c/o occasional cough; denies F/C, CP, ARPAN Nixon re-inserted O/N    Review of Systems: 12 point review of systems otherwise negative    MEDICATIONS  (STANDING):  ALBUTerol/ipratropium for Nebulization 3 milliLiter(s) Nebulizer every 4 hours  apixaban 2.5 milliGRAM(s) Oral every 12 hours  azithromycin   Tablet 250 milliGRAM(s) Oral daily  cefTRIAXone Injectable. 1000 milliGRAM(s) IV Push every 24 hours  cefTRIAXone Injectable.      clonazePAM Tablet 0.5 milliGRAM(s) Oral daily  docusate sodium 100 milliGRAM(s) Oral daily  FLUoxetine 20 milliGRAM(s) Oral daily  metoprolol     tartrate 50 milliGRAM(s) Oral two times a day  OLANZapine 15 milliGRAM(s) Oral daily  polyethylene glycol 3350 17 Gram(s) Oral at bedtime  senna 2 Tablet(s) Oral at bedtime  simvastatin 40 milliGRAM(s) Oral at bedtime  sodium chloride 0.9%. 1000 milliLiter(s) (100 mL/Hr) IV Continuous <Continuous>  sulfaSALAzine 500 milliGRAM(s) Oral four times a day  tamsulosin 0.4 milliGRAM(s) Oral at bedtime    MEDICATIONS  (PRN):  acetaminophen   Tablet 650 milliGRAM(s) Oral every 6 hours PRN For Temp greater than 38 C (100.4 F) or MILD PAIN (1-3)  ondansetron    Tablet 4 milliGRAM(s) Oral every 6 hours PRN Nausea and/or Vomiting      Allergies    Thorazine (Hepatotoxicity)    Intolerances          Vital Signs Last 24 Hrs  T(C): 36.6 (15 Feb 2018 08:41), Max: 36.6 (14 Feb 2018 21:30)  T(F): 97.8 (15 Feb 2018 08:41), Max: 97.8 (14 Feb 2018 21:30)  HR: 69 (15 Feb 2018 08:41) (69 - 86)  BP: 137/91 (15 Feb 2018 08:41) (137/91 - 148/83)  BP(mean): --  RR: 16 (15 Feb 2018 08:41) (16 - 18)  SpO2: 94% (15 Feb 2018 08:41) (93% - 94%)  CAPILLARY BLOOD GLUCOSE          02-14 @ 07:01  -  02-15 @ 07:00  --------------------------------------------------------  IN: 1020 mL / OUT: 5950 mL / NET: -4930 mL    02-15 @ 07:01  -  02-15 @ 11:34  --------------------------------------------------------  IN: 0 mL / OUT: 700 mL / NET: -700 mL        Physical Exam:  (at 10:30AM)  Daily     Daily   General:  well-appearing in NAD  HEENT:  MMM  CV:  no JVD  Lungs:  CTA B/L; normal WOB on NC  Abdomen:  soft NT ND  Extremities:  no edema B/L LE  Skin:  WWP  :  +Tiffani  Neuro:  AAOx3    LABS:                        11.8   8.4   )-----------( 194      ( 15 Feb 2018 07:29 )             37.2     02-15    143  |  103  |  23  ----------------------------<  94  4.1   |  29  |  1.99<H>    Ca    9.8      15 Feb 2018 07:29  Mg     2.4     02-14              RADIOLOGY & ADDITIONAL TESTS:    ---------------------------------------------------------------------------  I personally reviewed: [  ]EKG   [  ]CXR    [  ] CT    [  ]Other  ---------------------------------------------------------------------------  PLEASE CHECK WHEN PRESENT:     [  ]Heart Failure     [  ] Acute     [  ] Acute on Chronic     [  ] Chronic  -------------------------------------------------------------------     [  ]Diastolic [HFpEF]     [  ]Systolic [HFrEF]     [  ]Combined [HFpEF & HFrEF]     [  ]Other:  -------------------------------------------------------------------  [  ]DONNA     [  ]ATN     [  ]Reneal Medullary Necrosis     [  ]Renal Cortical Necrosis     [  ]Other Pathological Lesions:    [  ]CKD 1  [  ]CKD 2  [  ]CKD 3  [  ]CKD 4  [  ]CKD 5  [  ]Other  -------------------------------------------------------------------  [  ]Other/Unspecified:    --------------------------------------------------------------------    Abdominal Nutritional Status  [  ]Malnutrition: See Nutrition Note  [  ]Cachexia  [  ]Other:   [  ]Supplement Ordered:  [  ]Morbid Obesity (BMI >=40]

## 2018-02-15 NOTE — PROGRESS NOTE ADULT - PROBLEM SELECTOR PLAN 1
POA; cont. ceftriaxone + azithromycin (day #2/5-7), f/u cultures
POA; cont. ceftriaxone + azithromycin (day #3/5-7, can change to P.O.), f/u cultures
Met SIRS 3/4 for fever, leukocytosis and tachycardia  CXR present for consolidation  likely 2/2 PNA   - started on CTX/ Azithro (Day 1)  -F/u Bcx  - c/w CTX/azithro (Day 2of 5)    #LE edema  L>>R  Chronic venous changes   L warmer to touch then R, however no pain   - LE doppler negative
Met SIRS 3/4 for fever, leukocytosis and tachycardia  CXR present for consolidation  likely 2/2 PNA   - started on CTX/ Azithro (Day 1)  -F/u Bcx  -Holding anti-htn in setting of SIRS except 1/2 home dose Lopressor to prevent rebound tachycardia.    #LE edema  L>>R  Chronic venous changes   L warmer to touch then R, however no pain   - f/u LE Dopplers to r/o DVT
POA; cont. ceftriaxone + azithromycin (day #1/5-7), f/u cultures

## 2018-02-15 NOTE — PROGRESS NOTE ADULT - PROBLEM SELECTOR PLAN 2
Pt. refuses Nixon re-insertion; Pt. verbalized understanding re: benefits of procedure and risk of refusal; thought process linear; Pt. has capacity to refuse, per Psych; will cont. serial bladder scans, as Pt. willing to re-consider his decision, should his sx worsen; cont. Flomax, bowel regimen; outpatient  f/u arranged
cont. Nixon, Flomax, outpatient  f/u arranged
Atypical. Resolved.  EKG with afib with rvr initially and nonspecific st-t changes. Repeat EKG showing Afib with improved rate and same nonspecific st-t changes. Trop negative x2.   - Repeat EKG WNL  - Cardiology referral as outpatient for further workup
Atypical. Resolved.  EKG with afib with rvr initially and nonspecific st-t changes. Repeat EKG showing Afib with improved rate and same nonspecific st-t changes. Trop negative x2.   -Repeat EKG WNL  - Cardiology referral as outpatient for further workup
h/o "kidney disease," per Pt.; check U/S, monitor BMP, renally-dose rx, hold ARB, clarify baseline renal fxn

## 2018-02-15 NOTE — PHYSICAL THERAPY INITIAL EVALUATION ADULT - ADDITIONAL COMMENTS
Pt lives in a group home for the homeless who have psych issues (as per pt). Pt states there are 3 stairs to enter with handrail. Pt states he lives on 4th floor, there is an elevator but it breaks a lot. Pt states he was previously ambulating ~ 60ft with SC and could climb the flights of stairs if he had to.

## 2018-02-15 NOTE — PROGRESS NOTE ADULT - PROBLEM SELECTOR PLAN 6
cont. Zyprexa
cont. A/A nebs
cont. A/A nebs; assess need for home O2
-c/w home Azulfidine
-c/w home Azulfidine

## 2018-02-15 NOTE — PROGRESS NOTE ADULT - PROBLEM SELECTOR PROBLEM 3
Acute kidney injury
Acute kidney injury
Atrial fibrillation
Atrial fibrillation
Atrial fibrillation, unspecified type

## 2018-02-16 ENCOUNTER — INPATIENT (INPATIENT)
Facility: HOSPITAL | Age: 73
LOS: 5 days | Discharge: NURSING, REHAB OR LONG-TERM CARE | DRG: 682 | End: 2018-02-22
Attending: INTERNAL MEDICINE | Admitting: INTERNAL MEDICINE
Payer: MEDICARE

## 2018-02-16 VITALS
DIASTOLIC BLOOD PRESSURE: 83 MMHG | SYSTOLIC BLOOD PRESSURE: 116 MMHG | TEMPERATURE: 99 F | HEART RATE: 93 BPM | RESPIRATION RATE: 18 BRPM | OXYGEN SATURATION: 93 %

## 2018-02-16 DIAGNOSIS — J44.9 CHRONIC OBSTRUCTIVE PULMONARY DISEASE, UNSPECIFIED: ICD-10-CM

## 2018-02-16 DIAGNOSIS — R63.8 OTHER SYMPTOMS AND SIGNS CONCERNING FOOD AND FLUID INTAKE: ICD-10-CM

## 2018-02-16 DIAGNOSIS — R53.1 WEAKNESS: ICD-10-CM

## 2018-02-16 DIAGNOSIS — N17.9 ACUTE KIDNEY FAILURE, UNSPECIFIED: ICD-10-CM

## 2018-02-16 DIAGNOSIS — Z29.9 ENCOUNTER FOR PROPHYLACTIC MEASURES, UNSPECIFIED: ICD-10-CM

## 2018-02-16 DIAGNOSIS — N28.9 DISORDER OF KIDNEY AND URETER, UNSPECIFIED: ICD-10-CM

## 2018-02-16 DIAGNOSIS — I48.91 UNSPECIFIED ATRIAL FIBRILLATION: ICD-10-CM

## 2018-02-16 DIAGNOSIS — R33.9 RETENTION OF URINE, UNSPECIFIED: ICD-10-CM

## 2018-02-16 DIAGNOSIS — J18.9 PNEUMONIA, UNSPECIFIED ORGANISM: ICD-10-CM

## 2018-02-16 DIAGNOSIS — F20.9 SCHIZOPHRENIA, UNSPECIFIED: ICD-10-CM

## 2018-02-16 DIAGNOSIS — K50.90 CROHN'S DISEASE, UNSPECIFIED, WITHOUT COMPLICATIONS: ICD-10-CM

## 2018-02-16 LAB
ANION GAP SERPL CALC-SCNC: 15 MMOL/L — SIGNIFICANT CHANGE UP (ref 5–17)
APPEARANCE UR: CLEAR — SIGNIFICANT CHANGE UP
APPEARANCE UR: CLEAR — SIGNIFICANT CHANGE UP
APTT BLD: 32.2 SEC — SIGNIFICANT CHANGE UP (ref 27.5–37.4)
BACTERIA # UR AUTO: PRESENT /HPF
BASOPHILS NFR BLD AUTO: 0.1 % — SIGNIFICANT CHANGE UP (ref 0–2)
BILIRUB UR-MCNC: NEGATIVE — SIGNIFICANT CHANGE UP
BILIRUB UR-MCNC: NEGATIVE — SIGNIFICANT CHANGE UP
BUN SERPL-MCNC: 33 MG/DL — HIGH (ref 7–23)
CALCIUM SERPL-MCNC: 9.8 MG/DL — SIGNIFICANT CHANGE UP (ref 8.4–10.5)
CHLORIDE SERPL-SCNC: 101 MMOL/L — SIGNIFICANT CHANGE UP (ref 96–108)
CO2 SERPL-SCNC: 24 MMOL/L — SIGNIFICANT CHANGE UP (ref 22–31)
COLOR SPEC: YELLOW — SIGNIFICANT CHANGE UP
COLOR SPEC: YELLOW — SIGNIFICANT CHANGE UP
CREAT SERPL-MCNC: 2.42 MG/DL — HIGH (ref 0.5–1.3)
DIFF PNL FLD: (no result)
DIFF PNL FLD: (no result)
EOSINOPHIL NFR BLD AUTO: 0.1 % — SIGNIFICANT CHANGE UP (ref 0–6)
EPI CELLS # UR: SIGNIFICANT CHANGE UP /HPF (ref 0–5)
GLUCOSE SERPL-MCNC: 98 MG/DL — SIGNIFICANT CHANGE UP (ref 70–99)
GLUCOSE UR QL: NEGATIVE — SIGNIFICANT CHANGE UP
GLUCOSE UR QL: NEGATIVE — SIGNIFICANT CHANGE UP
HCT VFR BLD CALC: 39.9 % — SIGNIFICANT CHANGE UP (ref 39–50)
HGB BLD-MCNC: 13.4 G/DL — SIGNIFICANT CHANGE UP (ref 13–17)
INR BLD: 1.33 — HIGH (ref 0.88–1.16)
KETONES UR-MCNC: NEGATIVE — SIGNIFICANT CHANGE UP
KETONES UR-MCNC: NEGATIVE — SIGNIFICANT CHANGE UP
LEUKOCYTE ESTERASE UR-ACNC: (no result)
LEUKOCYTE ESTERASE UR-ACNC: (no result)
LYMPHOCYTES # BLD AUTO: 26.1 % — SIGNIFICANT CHANGE UP (ref 13–44)
MCHC RBC-ENTMCNC: 29 PG — SIGNIFICANT CHANGE UP (ref 27–34)
MCHC RBC-ENTMCNC: 33.6 G/DL — SIGNIFICANT CHANGE UP (ref 32–36)
MCV RBC AUTO: 86.4 FL — SIGNIFICANT CHANGE UP (ref 80–100)
MONOCYTES NFR BLD AUTO: 9.4 % — SIGNIFICANT CHANGE UP (ref 2–14)
NEUTROPHILS NFR BLD AUTO: 64.3 % — SIGNIFICANT CHANGE UP (ref 43–77)
NITRITE UR-MCNC: NEGATIVE — SIGNIFICANT CHANGE UP
NITRITE UR-MCNC: NEGATIVE — SIGNIFICANT CHANGE UP
PH UR: 6.5 — SIGNIFICANT CHANGE UP (ref 5–8)
PH UR: 7 — SIGNIFICANT CHANGE UP (ref 5–8)
PLATELET # BLD AUTO: 267 K/UL — SIGNIFICANT CHANGE UP (ref 150–400)
POTASSIUM SERPL-MCNC: 5 MMOL/L — SIGNIFICANT CHANGE UP (ref 3.5–5.3)
POTASSIUM SERPL-SCNC: 5 MMOL/L — SIGNIFICANT CHANGE UP (ref 3.5–5.3)
PROT UR-MCNC: 100 MG/DL
PROT UR-MCNC: 30 MG/DL
PROTHROM AB SERPL-ACNC: 14.8 SEC — HIGH (ref 9.8–12.7)
RBC # BLD: 4.62 M/UL — SIGNIFICANT CHANGE UP (ref 4.2–5.8)
RBC # FLD: 14 % — SIGNIFICANT CHANGE UP (ref 10.3–16.9)
RBC CASTS # UR COMP ASSIST: < 5 /HPF — SIGNIFICANT CHANGE UP
SODIUM SERPL-SCNC: 140 MMOL/L — SIGNIFICANT CHANGE UP (ref 135–145)
SP GR SPEC: 1.01 — SIGNIFICANT CHANGE UP (ref 1–1.03)
SP GR SPEC: <=1.005 — SIGNIFICANT CHANGE UP (ref 1–1.03)
UROBILINOGEN FLD QL: 0.2 E.U./DL — SIGNIFICANT CHANGE UP
UROBILINOGEN FLD QL: 0.2 E.U./DL — SIGNIFICANT CHANGE UP
WBC # BLD: 11.7 K/UL — HIGH (ref 3.8–10.5)
WBC # FLD AUTO: 11.7 K/UL — HIGH (ref 3.8–10.5)
WBC UR QL: (no result) /HPF

## 2018-02-16 PROCEDURE — 99285 EMERGENCY DEPT VISIT HI MDM: CPT

## 2018-02-16 PROCEDURE — 99223 1ST HOSP IP/OBS HIGH 75: CPT | Mod: GC

## 2018-02-16 RX ORDER — OLANZAPINE 15 MG/1
10 TABLET, FILM COATED ORAL DAILY
Qty: 0 | Refills: 0 | Status: DISCONTINUED | OUTPATIENT
Start: 2018-02-16 | End: 2018-02-16

## 2018-02-16 RX ORDER — CLONAZEPAM 1 MG
0.5 TABLET ORAL DAILY
Qty: 0 | Refills: 0 | Status: DISCONTINUED | OUTPATIENT
Start: 2018-02-16 | End: 2018-02-22

## 2018-02-16 RX ORDER — AZITHROMYCIN 500 MG/1
250 TABLET, FILM COATED ORAL ONCE
Qty: 0 | Refills: 0 | Status: DISCONTINUED | OUTPATIENT
Start: 2018-02-17 | End: 2018-02-19

## 2018-02-16 RX ORDER — SIMVASTATIN 20 MG/1
40 TABLET, FILM COATED ORAL AT BEDTIME
Qty: 0 | Refills: 0 | Status: DISCONTINUED | OUTPATIENT
Start: 2018-02-16 | End: 2018-02-22

## 2018-02-16 RX ORDER — OLANZAPINE 15 MG/1
15 TABLET, FILM COATED ORAL DAILY
Qty: 0 | Refills: 0 | Status: DISCONTINUED | OUTPATIENT
Start: 2018-02-16 | End: 2018-02-22

## 2018-02-16 RX ORDER — SODIUM CHLORIDE 9 MG/ML
1000 INJECTION INTRAMUSCULAR; INTRAVENOUS; SUBCUTANEOUS ONCE
Qty: 0 | Refills: 0 | Status: COMPLETED | OUTPATIENT
Start: 2018-02-16 | End: 2018-02-16

## 2018-02-16 RX ORDER — NIFEDIPINE 30 MG
90 TABLET, EXTENDED RELEASE 24 HR ORAL DAILY
Qty: 0 | Refills: 0 | Status: DISCONTINUED | OUTPATIENT
Start: 2018-02-16 | End: 2018-02-22

## 2018-02-16 RX ORDER — FLUOXETINE HCL 10 MG
20 CAPSULE ORAL DAILY
Qty: 0 | Refills: 0 | Status: DISCONTINUED | OUTPATIENT
Start: 2018-02-16 | End: 2018-02-22

## 2018-02-16 RX ORDER — METOPROLOL TARTRATE 50 MG
100 TABLET ORAL
Qty: 0 | Refills: 0 | Status: DISCONTINUED | OUTPATIENT
Start: 2018-02-16 | End: 2018-02-22

## 2018-02-16 RX ORDER — IPRATROPIUM/ALBUTEROL SULFATE 18-103MCG
3 AEROSOL WITH ADAPTER (GRAM) INHALATION EVERY 6 HOURS
Qty: 0 | Refills: 0 | Status: DISCONTINUED | OUTPATIENT
Start: 2018-02-16 | End: 2018-02-22

## 2018-02-16 RX ORDER — APIXABAN 2.5 MG/1
2.5 TABLET, FILM COATED ORAL EVERY 12 HOURS
Qty: 0 | Refills: 0 | Status: DISCONTINUED | OUTPATIENT
Start: 2018-02-16 | End: 2018-02-22

## 2018-02-16 RX ORDER — INFLUENZA VIRUS VACCINE 15; 15; 15; 15 UG/.5ML; UG/.5ML; UG/.5ML; UG/.5ML
0.5 SUSPENSION INTRAMUSCULAR ONCE
Qty: 0 | Refills: 0 | Status: COMPLETED | OUTPATIENT
Start: 2018-02-16 | End: 2018-02-16

## 2018-02-16 RX ORDER — CEFTRIAXONE 500 MG/1
1 INJECTION, POWDER, FOR SOLUTION INTRAMUSCULAR; INTRAVENOUS ONCE
Qty: 0 | Refills: 0 | Status: DISCONTINUED | OUTPATIENT
Start: 2018-02-17 | End: 2018-02-19

## 2018-02-16 RX ORDER — CHOLECALCIFEROL (VITAMIN D3) 125 MCG
1000 CAPSULE ORAL DAILY
Qty: 0 | Refills: 0 | Status: DISCONTINUED | OUTPATIENT
Start: 2018-02-16 | End: 2018-02-22

## 2018-02-16 RX ORDER — SULFASALAZINE 500 MG
500 TABLET ORAL
Qty: 0 | Refills: 0 | Status: DISCONTINUED | OUTPATIENT
Start: 2018-02-16 | End: 2018-02-22

## 2018-02-16 RX ORDER — TAMSULOSIN HYDROCHLORIDE 0.4 MG/1
0.4 CAPSULE ORAL AT BEDTIME
Qty: 0 | Refills: 0 | Status: DISCONTINUED | OUTPATIENT
Start: 2018-02-16 | End: 2018-02-22

## 2018-02-16 RX ADMIN — SIMVASTATIN 40 MILLIGRAM(S): 20 TABLET, FILM COATED ORAL at 23:39

## 2018-02-16 RX ADMIN — Medication 500 MILLIGRAM(S): at 23:39

## 2018-02-16 RX ADMIN — TAMSULOSIN HYDROCHLORIDE 0.4 MILLIGRAM(S): 0.4 CAPSULE ORAL at 23:39

## 2018-02-16 RX ADMIN — SODIUM CHLORIDE 2000 MILLILITER(S): 9 INJECTION INTRAMUSCULAR; INTRAVENOUS; SUBCUTANEOUS at 17:36

## 2018-02-16 RX ADMIN — SODIUM CHLORIDE 2000 MILLILITER(S): 9 INJECTION INTRAMUSCULAR; INTRAVENOUS; SUBCUTANEOUS at 15:41

## 2018-02-16 NOTE — H&P ADULT - PROBLEM SELECTOR PLAN 4
2/17 is day 5/5 for CAP treatment, discharged on oral antibiotics, will give final dose of ceftriaxone 1g and azithromycin 250mg  -continue to monitor, not meeting SIRS criteria, no SOB, no cough, afebrile

## 2018-02-16 NOTE — ED PROVIDER NOTE - MEDICAL DECISION MAKING DETAILS
Pt sent from his residence for c/o weakness, s/p discharge yest for pna, arf, urinary retention.  Plan labs, ivf, pt consult.  Pt refused ct head.  CM involved and aware. Pt sent from his residence for c/o weakness, s/p discharge yest for pna, arf, urinary retention.  Plan labs, ivf, pt consult.  Pt refused ct head but had not acute process on ct head 2/11.  CM involved and aware.

## 2018-02-16 NOTE — ED ADULT NURSE NOTE - CHPI ED SYMPTOMS NEG
no fever/no weakness/no chills/no nausea/no numbness/no tingling/no pain/no dizziness/no vomiting/no decreased eating/drinking

## 2018-02-16 NOTE — ED ADULT TRIAGE NOTE - CHIEF COMPLAINT QUOTE
pt coming from [facility] stating he can't walk. Pt was d/berenice from hospital yesterday according to EMS nurse at residence states he isn't independent enough for independent living. Pt is A and O x 3, reagan POA.

## 2018-02-16 NOTE — H&P ADULT - PROBLEM SELECTOR PLAN 2
-likely secondary to dehydration as above, will continue to trend creatinine daily  -will avoid nephrotoxic drugs  -c/w reagan for obstruction and mild R hydronephrosis

## 2018-02-16 NOTE — H&P ADULT - NSHPPHYSICALEXAM_GEN_ALL_CORE
.  VITAL SIGNS:  T(C): 35.8 (02-16-18 @ 18:06), Max: 37.1 (02-16-18 @ 13:30)  T(F): 96.5 (02-16-18 @ 18:06), Max: 98.7 (02-16-18 @ 13:30)  HR: 98 (02-16-18 @ 18:06) (90 - 98)  BP: 126/93 (02-16-18 @ 18:06) (116/83 - 126/93)  BP(mean): --  RR: 19 (02-16-18 @ 18:06) (18 - 19)  SpO2: 93% (02-16-18 @ 18:06) (93% - 94%)  Wt(kg): --    PHYSICAL EXAM:    Constitutional: WDWN resting comfortably in bed; NAD  Head: NC/AT  Eyes: PERRL, EOMI, clear conjunctiva  ENT: no nasal discharge; uvula midline, no oropharyngeal erythema or exudates; MMM  Neck: supple; no JVD or thyromegaly  Respiratory: CTA B/L; no W/R/R, no retractions  Cardiac: +S1/S2; RRR; no M/R/G; PMI non-displaced  Gastrointestinal: soft, NT/ND; no rebound or guarding; +BSx4  Genitourinary: normal external genitalia  Back: spine midline, no bony tenderness or step-offs; no CVAT B/L  Extremities: WWP, no clubbing or cyanosis; no peripheral edema  Musculoskeletal: NROM x4; no joint swelling, tenderness or erythema  Vascular: 2+ radial, femoral, DP/PT pulses B/L  Dermatologic: skin warm, dry and intact; no rashes, wounds, or scars  Lymphatic: no submandibular or cervical LAD  Neurologic: AAOx3; CNII-XII grossly intact; no focal deficits  Psychiatric: affect and characteristics of appearance, verbalizations, behaviors are appropriate .  VITAL SIGNS:  T(C): 35.8 (02-16-18 @ 18:06), Max: 37.1 (02-16-18 @ 13:30)  T(F): 96.5 (02-16-18 @ 18:06), Max: 98.7 (02-16-18 @ 13:30)  HR: 98 (02-16-18 @ 18:06) (90 - 98)  BP: 126/93 (02-16-18 @ 18:06) (116/83 - 126/93)  BP(mean): --  RR: 19 (02-16-18 @ 18:06) (18 - 19)  SpO2: 93% (02-16-18 @ 18:06) (93% - 94%)  Wt(kg): --    PHYSICAL EXAM:    Constitutional: WDWN resting comfortably in bed; NAD  Head: NC/AT  Eyes: PERRL, EOMI, clear conjunctiva  ENT: no nasal discharge; uvula midline, no oropharyngeal erythema or exudates; MMM  Neck: supple; no JVD or thyromegaly  Respiratory: expiratory wheezing  Cardiac: +S1/S2; RRR; no M/R/G; PMI non-displaced  Gastrointestinal: soft, NT/ND; no rebound or guarding; obese, no suprapubic tenderness, +BSx4  Genitourinary: reagan in place  Back: spine midline, no bony tenderness or step-offs; no CVAT B/L  Extremities: WWP, no clubbing or cyanosis; no peripheral edema  Musculoskeletal: NROM x4; no joint swelling,    Vascular: 2+ radial, femoral, DP pulses B/L  Dermatologic: skin warm, dry and intact; no rashes, wounds, or scars, chronic erythema of the LE on extensor surfaces  Lymphatic: no submandibular or cervical LAD  Neurologic: AAOx3; CNII-XII grossly intact; no focal deficits  Psychiatric: affect and characteristics of appearance, verbalizations, behaviors are appropriate

## 2018-02-16 NOTE — PHYSICAL THERAPY INITIAL EVALUATION ADULT - GENERAL OBSERVATIONS, REHAB EVAL
Received semi-supine in bed with +hep lock, on room air, in no apparent distress. Patient denies pain at rest

## 2018-02-16 NOTE — H&P ADULT - NSHPLABSRESULTS_GEN_ALL_CORE
Basic Metabolic Panel (02.16.18 @ 15:43)    Sodium, Serum: 140 mmol/L    Potassium, Serum: 5.0 mmol/L    Chloride, Serum: 101 mmol/L    Carbon Dioxide, Serum: 24 mmol/L    Anion Gap, Serum: 15 mmol/L    Blood Urea Nitrogen, Serum: 33: Checked result, consistent with patient history mg/dL    Creatinine, Serum: 2.42 mg/dL    Glucose, Serum: 98 mg/dL    Calcium, Total Serum: 9.8 mg/dL    eGFR if Non : 26: The units for eGFR are ml/min/1.73m2 (normalized body surface area). The  eGFR is calculated from a serum creatinine using the CKD-EPI equation.

## 2018-02-16 NOTE — H&P ADULT - HISTORY OF PRESENT ILLNESS
Pt is a 72 year old M w/pmhx of Afib on eliquis (2.5mg BID) HTN, HLD, COPD (not on home o2 w/o intubation history) abdominal aneurysm (4cm 2017) Crohn's disease, anxiety, schizophrenia and recent hospital admission at [facility] for CAP and found to have urinary retention with mild right sided hydronephrosis who was discharged to his psychiatric independent living facility on 2/15/18 and returned tonight with complaints of weakness and the inability to care for himself. He states that he could not get up to use the bathroom independently at his home because of weakness and dizziness. He also reports having difficulty controlling his bowels and "could not make it to the bathroom fast enough" while at home. He also reports intermittent burning with urination since the reagan has been placed but reports improvement in his urinary retention since placement. Pt is a 72 year old M w/pmhx of Afib on eliquis (2.5mg BID) HTN, HLD, COPD (not on home o2 w/o intubation history) abdominal aneurysm (4cm 2017) Crohn's disease, anxiety, schizophrenia and recent hospital admission at [facility] for CAP and found to have urinary retention with mild right sided hydronephrosis who was discharged to his psychiatric independent living facility on 2/15/18 and returned tonight with complaints of weakness and the inability to care for himself. He states that he could not get up to use the bathroom independently at his home because of weakness and dizziness. He also reports having difficulty controlling his bowels and "could not make it to the bathroom fast enough" while at home. He also reports intermittent burning with urination since the reagan has been placed but reports improvement in his urinary retention since placement. Pt was sent the hospital by the living facility given concerns that the patient cannot care for himself.

## 2018-02-16 NOTE — H&P ADULT - PROBLEM SELECTOR PLAN 1
pt reports feeling weak at home and dizzy with worsening creatinine since discharge yesterday, while he reports adequate PO intake, appears euvolemic on exam, s/p 2LNS in the ED and orthostatic positive on initial ED vitals, most likely secondary dehydration  -will hold off on additional IVF at this time as pt is asymptomatic and appears euvolemic on exam, will encourage PO intake  -monitor I/Os  -PT w/PRETTY planning initiated in the ED as pt cannot care for himself at his independent living facility  -hold all stool softeners given history of inability to control his stools

## 2018-02-16 NOTE — PHYSICAL THERAPY INITIAL EVALUATION ADULT - CRITERIA FOR SKILLED THERAPEUTIC INTERVENTIONS
functional limitations in following categories/impairments found/risk reduction/prevention/therapy frequency/anticipated discharge recommendation/rehab potential

## 2018-02-16 NOTE — ED PROVIDER NOTE - PROGRESS NOTE DETAILS
Per PT and CM, pt needs PRETTY placement. CM evaluating if he can be directly admitted to [facility] from ED. Per SW, pt needs tba to med svc before PRETTY placement due to needing a Level 2 evaluation for PRETTY placement 2/2 his mental illness.  Will admit. Pt w + orthostatic vs and c/o dizziness w standing w drop in bp.  Additional ivf ordered.  Cr unchanged from last admit.  Inpt team will cont to follow.

## 2018-02-16 NOTE — H&P ADULT - PROBLEM SELECTOR PLAN 3
likely secondary to BPH, urology saw the patient during his previous admission, pt was discharged with a reagan for obstruction and mild R hydronephrosis seen on u/s  -will c/w reagan and plan for follow up while at [facility] likely secondary to BPH, urology saw the patient during his previous admission, pt was discharged with a reagan for obstruction and mild R hydronephrosis seen on u/s  -will c/w reagan and plan for follow up while at [facility]  -c/w folmax 0.4mg as per urology during last admission, w/plan for repeat ROV once flomax is therapeutic

## 2018-02-16 NOTE — H&P ADULT - PROBLEM SELECTOR PLAN 10
DASH diet  -replete electrolytes as needed    Dispo    Admit to medicine    PRETTY planning in progress DASH diet  -replete electrolytes as needed  -c/w daily vitamin D    Dispo    Admit to medicine    PRETTY planning in progress

## 2018-02-16 NOTE — PHYSICAL THERAPY INITIAL EVALUATION ADULT - PERTINENT HX OF CURRENT PROBLEM, REHAB EVAL
Patient is a 72 year old M who presents from home after being discharged from [facility] yesterday after his supportive housing deemed that he wasn't able to take care of himself at home and sent him back to the ED.

## 2018-02-16 NOTE — ED ADULT NURSE NOTE - OBJECTIVE STATEMENT
73 y/o male sent from [facility] for inability to perform ADLs independently. Nixon catheter POA, draining clear yellow urine.

## 2018-02-16 NOTE — H&P ADULT - PROBLEM SELECTOR PLAN 7
-c/w Eliquis 2.5mg BID, Procardia 90mg daily    HTN  -holding ARB given DONNA and positive orthostatics -c/w Eliquis 2.5mg BID, Procardia 90mg daily, metoprolol 100mg BID    HTN  -holding ARB given DONNA and positive orthostatics

## 2018-02-16 NOTE — PHYSICAL THERAPY INITIAL EVALUATION ADULT - ADDITIONAL COMMENTS
Patient lives in supportive housing. Apartment building with no steps to enter. Prior to admission, patient was ambulating with rolling walker.

## 2018-02-16 NOTE — ED ADULT NURSE NOTE - CHIEF COMPLAINT QUOTE
pt coming from [facility] in [place] stating he can't walk. Pt was d/berenice from hospital yesterday according to EMS nurse at residence states he isn't independent enough for independent living. Pt is A and O x 3, reagan POA.

## 2018-02-16 NOTE — H&P ADULT - NSHPREVIEWOFSYSTEMS_GEN_ALL_CORE
REVIEW OF SYSTEMS:    CONSTITUTIONAL: No weakness, fevers or chills  EYES/ENT: No visual changes;  No vertigo or throat pain   NECK: No pain or stiffness  RESPIRATORY: No cough, wheezing, hemoptysis; No shortness of breath  CARDIOVASCULAR: No chest pain or palpitations  GASTROINTESTINAL: No abdominal or epigastric pain. No nausea, vomiting, or hematemesis; No diarrhea or constipation. No melena or hematochezia.  GENITOURINARY: No dysuria, frequency or hematuria  NEUROLOGICAL: No numbness or weakness  SKIN: No itching, burning, rashes, or lesions   All other review of systems is negative unless indicated above. REVIEW OF SYSTEMS:    CONSTITUTIONAL: as per HPI  EYES/ENT: No visual changes;  No vertigo or throat pain   NECK: No pain or stiffness  RESPIRATORY: No cough, wheezing, hemoptysis; No shortness of breath  CARDIOVASCULAR: No chest pain or palpitations  GASTROINTESTINAL: No abdominal or epigastric pain. No nausea, vomiting,   GENITOURINARY: as per HPI  NEUROLOGICAL: No numbness    SKIN: No itching, burning, rashes, or lesions   All other review of systems is negative unless indicated above.

## 2018-02-16 NOTE — H&P ADULT - ASSESSMENT
Pt is a 71 yo M w/pmhx of Afib, HTN, HLD, COPD, schizophrenia, anxiety, Crohn's disease and recent admission for CAP and urinary retention who presented with weakness and failure to thrive at his independent psych. living facility.

## 2018-02-16 NOTE — ED PROVIDER NOTE - OBJECTIVE STATEMENT
73 yo male h/o Afib (on eliquis 2.5mg BID), HTN, HLD, COPD (no home O2), never been intubated, abdominal aneurysm (~4cm one yr ago), Crohn's disease, schizophrenia, anxiety s/p admit 2/13 for pna treated w abx, arf 2/2 urinary retention treated w reagan placement and ivf discharged yest 71 yo male h/o Afib (on eliquis 2.5mg BID), HTN, HLD, COPD (no home O2), never been intubated, abdominal aneurysm (~4cm one yr ago), Crohn's disease, schizophrenia, anxiety s/p admit 2/13 for pna treated w abx, arf 2/2 urinary retention treated w reagan placement and ivf discharged yest sent back from his living facility after defecating on the floor. Pt states he has been having freq bm 2/2 being given stool softeners and is too weak to walk to the bathroom so defecated on the floor.  Pt reports dizziness more severe when standing and inability to stand and take care of self 2/2 feeling too weak.  No ha, acute change in vision or speech, numbness or weakness in ext, cp, palpitations, sob, cough, uri sx, abd pain, n/v, dysuria.  Pt c/o pain at penis 2/2 reagan and that reagan pain also prevents him from moving. Pt states he is "falling apart" and cannot care for himself.

## 2018-02-16 NOTE — PHYSICAL THERAPY INITIAL EVALUATION ADULT - MODALITIES TREATMENT COMMENTS
FIM locomotion: TBA; FIM stairs TBA FIM locomotion: TBA; FIM stairs TBA; Short Term Goals (2/16 - 2/23): Bed mobility: Supine to sit/sit to supine to minimum assist; Sit to Stand with rolling walker with stand by assist; Gait with rolling walker x 50 feet with rolling walker. Outcome Summary: Tolerated session well however became dizzy upon standing, preventing further ambulation. Patient required copious assistance for bed mobility and transfers. No pain or loss of balance endorsed with session. Plan to progress ambulation as tolerated next session. Limited by pain, deconditioning, impaired dynamic balance contributing to unsteady gait and transfers, and decreased functional endurance, hindering functional mobility. Patient would continue to benefit from physical therapy services to improve all functional mobility and assist with return to prior level of function. Returned to semi-supine in bed with +call bell, all lines intact, left as received, in no apparent distress. Returned to semi-supine in bed in NAD

## 2018-02-16 NOTE — ED PROVIDER NOTE - CONSTITUTIONAL, MLM
normal... chronically ill appearing, well nourished, sleeping comfortably, easily woken, oriented to person, place, time/situation and in no apparent distress.

## 2018-02-16 NOTE — H&P ADULT - PROBLEM SELECTOR PLAN 8
-c/w home medications Zyprexa 15mg daily, Prozac 20mg daily -c/w home medications Zyprexa 15mg daily, Prozac 20mg daily,     # anxiety: Klonopin 0.5mg daily to avoid withdrawal, hold additional 1mg nighttime dose given prior dizziness during last admission after 1mg of Klonopin     -social work initiated plans for approval to [facility] given tawanda. history will require additional approval

## 2018-02-17 LAB
ANION GAP SERPL CALC-SCNC: 14 MMOL/L — SIGNIFICANT CHANGE UP (ref 5–17)
BUN SERPL-MCNC: 33 MG/DL — HIGH (ref 7–23)
CALCIUM SERPL-MCNC: 9.9 MG/DL — SIGNIFICANT CHANGE UP (ref 8.4–10.5)
CHLORIDE SERPL-SCNC: 104 MMOL/L — SIGNIFICANT CHANGE UP (ref 96–108)
CO2 SERPL-SCNC: 27 MMOL/L — SIGNIFICANT CHANGE UP (ref 22–31)
CREAT SERPL-MCNC: 2.2 MG/DL — HIGH (ref 0.5–1.3)
CULTURE RESULTS: SIGNIFICANT CHANGE UP
CULTURE RESULTS: SIGNIFICANT CHANGE UP
GLUCOSE SERPL-MCNC: 87 MG/DL — SIGNIFICANT CHANGE UP (ref 70–99)
MAGNESIUM SERPL-MCNC: 2.3 MG/DL — SIGNIFICANT CHANGE UP (ref 1.6–2.6)
PHOSPHATE SERPL-MCNC: 3.8 MG/DL — SIGNIFICANT CHANGE UP (ref 2.5–4.5)
POTASSIUM SERPL-MCNC: 4 MMOL/L — SIGNIFICANT CHANGE UP (ref 3.5–5.3)
POTASSIUM SERPL-SCNC: 4 MMOL/L — SIGNIFICANT CHANGE UP (ref 3.5–5.3)
SODIUM SERPL-SCNC: 145 MMOL/L — SIGNIFICANT CHANGE UP (ref 135–145)
SPECIMEN SOURCE: SIGNIFICANT CHANGE UP
SPECIMEN SOURCE: SIGNIFICANT CHANGE UP

## 2018-02-17 PROCEDURE — 99232 SBSQ HOSP IP/OBS MODERATE 35: CPT

## 2018-02-17 RX ORDER — ACETAMINOPHEN 500 MG
650 TABLET ORAL ONCE
Qty: 0 | Refills: 0 | Status: COMPLETED | OUTPATIENT
Start: 2018-02-17 | End: 2018-02-17

## 2018-02-17 RX ADMIN — Medication 500 MILLIGRAM(S): at 06:26

## 2018-02-17 RX ADMIN — Medication 0.5 MILLIGRAM(S): at 11:55

## 2018-02-17 RX ADMIN — Medication 500 MILLIGRAM(S): at 11:55

## 2018-02-17 RX ADMIN — Medication 650 MILLIGRAM(S): at 18:00

## 2018-02-17 RX ADMIN — TAMSULOSIN HYDROCHLORIDE 0.4 MILLIGRAM(S): 0.4 CAPSULE ORAL at 23:04

## 2018-02-17 RX ADMIN — Medication 100 MILLIGRAM(S): at 06:27

## 2018-02-17 RX ADMIN — OLANZAPINE 15 MILLIGRAM(S): 15 TABLET, FILM COATED ORAL at 11:55

## 2018-02-17 RX ADMIN — Medication 1000 UNIT(S): at 11:55

## 2018-02-17 RX ADMIN — Medication 100 MILLIGRAM(S): at 17:02

## 2018-02-17 RX ADMIN — Medication 20 MILLIGRAM(S): at 11:55

## 2018-02-17 RX ADMIN — Medication 650 MILLIGRAM(S): at 17:01

## 2018-02-17 RX ADMIN — Medication 500 MILLIGRAM(S): at 23:10

## 2018-02-17 RX ADMIN — APIXABAN 2.5 MILLIGRAM(S): 2.5 TABLET, FILM COATED ORAL at 06:26

## 2018-02-17 RX ADMIN — Medication 500 MILLIGRAM(S): at 17:02

## 2018-02-17 RX ADMIN — Medication 3 MILLILITER(S): at 23:04

## 2018-02-17 RX ADMIN — SIMVASTATIN 40 MILLIGRAM(S): 20 TABLET, FILM COATED ORAL at 23:04

## 2018-02-17 RX ADMIN — Medication 3 MILLILITER(S): at 11:56

## 2018-02-17 RX ADMIN — Medication 90 MILLIGRAM(S): at 06:26

## 2018-02-17 RX ADMIN — APIXABAN 2.5 MILLIGRAM(S): 2.5 TABLET, FILM COATED ORAL at 17:01

## 2018-02-17 RX ADMIN — Medication 3 MILLILITER(S): at 17:02

## 2018-02-17 RX ADMIN — Medication 3 MILLILITER(S): at 06:26

## 2018-02-17 NOTE — PROGRESS NOTE ADULT - ATTENDING COMMENTS
Patient seen and discussed with resident. Agree with plan as above. Patient seen and discussed with resident. Agree with plan as above. Note, b/l Cr ~2-2.4, do no suspect patient's renal function is off significantly from baseline.

## 2018-02-18 LAB
ANION GAP SERPL CALC-SCNC: 15 MMOL/L — SIGNIFICANT CHANGE UP (ref 5–17)
BUN SERPL-MCNC: 33 MG/DL — HIGH (ref 7–23)
CALCIUM SERPL-MCNC: 9.8 MG/DL — SIGNIFICANT CHANGE UP (ref 8.4–10.5)
CHLORIDE SERPL-SCNC: 103 MMOL/L — SIGNIFICANT CHANGE UP (ref 96–108)
CO2 SERPL-SCNC: 25 MMOL/L — SIGNIFICANT CHANGE UP (ref 22–31)
CREAT SERPL-MCNC: 2.26 MG/DL — HIGH (ref 0.5–1.3)
CULTURE RESULTS: NO GROWTH — SIGNIFICANT CHANGE UP
GLUCOSE SERPL-MCNC: 101 MG/DL — HIGH (ref 70–99)
HCT VFR BLD CALC: 44.5 % — SIGNIFICANT CHANGE UP (ref 39–50)
HGB BLD-MCNC: 14.1 G/DL — SIGNIFICANT CHANGE UP (ref 13–17)
MAGNESIUM SERPL-MCNC: 2.3 MG/DL — SIGNIFICANT CHANGE UP (ref 1.6–2.6)
MCHC RBC-ENTMCNC: 27.6 PG — SIGNIFICANT CHANGE UP (ref 27–34)
MCHC RBC-ENTMCNC: 31.7 G/DL — LOW (ref 32–36)
MCV RBC AUTO: 87.3 FL — SIGNIFICANT CHANGE UP (ref 80–100)
PLATELET # BLD AUTO: 309 K/UL — SIGNIFICANT CHANGE UP (ref 150–400)
POTASSIUM SERPL-MCNC: 4.3 MMOL/L — SIGNIFICANT CHANGE UP (ref 3.5–5.3)
POTASSIUM SERPL-SCNC: 4.3 MMOL/L — SIGNIFICANT CHANGE UP (ref 3.5–5.3)
RBC # BLD: 5.1 M/UL — SIGNIFICANT CHANGE UP (ref 4.2–5.8)
RBC # FLD: 14.1 % — SIGNIFICANT CHANGE UP (ref 10.3–16.9)
SODIUM SERPL-SCNC: 143 MMOL/L — SIGNIFICANT CHANGE UP (ref 135–145)
SPECIMEN SOURCE: SIGNIFICANT CHANGE UP
WBC # BLD: 9.1 K/UL — SIGNIFICANT CHANGE UP (ref 3.8–10.5)
WBC # FLD AUTO: 9.1 K/UL — SIGNIFICANT CHANGE UP (ref 3.8–10.5)

## 2018-02-18 PROCEDURE — 99232 SBSQ HOSP IP/OBS MODERATE 35: CPT | Mod: GC

## 2018-02-18 RX ADMIN — OLANZAPINE 15 MILLIGRAM(S): 15 TABLET, FILM COATED ORAL at 12:35

## 2018-02-18 RX ADMIN — Medication 0.5 MILLIGRAM(S): at 12:34

## 2018-02-18 RX ADMIN — SIMVASTATIN 40 MILLIGRAM(S): 20 TABLET, FILM COATED ORAL at 21:55

## 2018-02-18 RX ADMIN — TAMSULOSIN HYDROCHLORIDE 0.4 MILLIGRAM(S): 0.4 CAPSULE ORAL at 21:55

## 2018-02-18 RX ADMIN — Medication 1000 UNIT(S): at 12:35

## 2018-02-18 RX ADMIN — Medication 500 MILLIGRAM(S): at 17:30

## 2018-02-18 RX ADMIN — Medication 100 MILLIGRAM(S): at 17:30

## 2018-02-18 RX ADMIN — Medication 3 MILLILITER(S): at 18:00

## 2018-02-18 RX ADMIN — Medication 90 MILLIGRAM(S): at 06:13

## 2018-02-18 RX ADMIN — Medication 3 MILLILITER(S): at 12:35

## 2018-02-18 RX ADMIN — APIXABAN 2.5 MILLIGRAM(S): 2.5 TABLET, FILM COATED ORAL at 06:13

## 2018-02-18 RX ADMIN — Medication 3 MILLILITER(S): at 06:14

## 2018-02-18 RX ADMIN — Medication 20 MILLIGRAM(S): at 12:34

## 2018-02-18 RX ADMIN — Medication 100 MILLIGRAM(S): at 06:13

## 2018-02-18 RX ADMIN — Medication 500 MILLIGRAM(S): at 06:16

## 2018-02-18 RX ADMIN — APIXABAN 2.5 MILLIGRAM(S): 2.5 TABLET, FILM COATED ORAL at 17:30

## 2018-02-18 RX ADMIN — Medication 500 MILLIGRAM(S): at 12:34

## 2018-02-19 LAB
ANION GAP SERPL CALC-SCNC: 14 MMOL/L — SIGNIFICANT CHANGE UP (ref 5–17)
BUN SERPL-MCNC: 33 MG/DL — HIGH (ref 7–23)
CALCIUM SERPL-MCNC: 9.8 MG/DL — SIGNIFICANT CHANGE UP (ref 8.4–10.5)
CHLORIDE SERPL-SCNC: 100 MMOL/L — SIGNIFICANT CHANGE UP (ref 96–108)
CO2 SERPL-SCNC: 25 MMOL/L — SIGNIFICANT CHANGE UP (ref 22–31)
CREAT SERPL-MCNC: 2.15 MG/DL — HIGH (ref 0.5–1.3)
GLUCOSE SERPL-MCNC: 94 MG/DL — SIGNIFICANT CHANGE UP (ref 70–99)
MAGNESIUM SERPL-MCNC: 2.2 MG/DL — SIGNIFICANT CHANGE UP (ref 1.6–2.6)
POTASSIUM SERPL-MCNC: 3.8 MMOL/L — SIGNIFICANT CHANGE UP (ref 3.5–5.3)
POTASSIUM SERPL-SCNC: 3.8 MMOL/L — SIGNIFICANT CHANGE UP (ref 3.5–5.3)
SODIUM SERPL-SCNC: 139 MMOL/L — SIGNIFICANT CHANGE UP (ref 135–145)

## 2018-02-19 PROCEDURE — 99232 SBSQ HOSP IP/OBS MODERATE 35: CPT | Mod: GC

## 2018-02-19 RX ADMIN — Medication 500 MILLIGRAM(S): at 17:38

## 2018-02-19 RX ADMIN — Medication 500 MILLIGRAM(S): at 04:39

## 2018-02-19 RX ADMIN — Medication 0.5 MILLIGRAM(S): at 11:51

## 2018-02-19 RX ADMIN — Medication 100 MILLIGRAM(S): at 17:38

## 2018-02-19 RX ADMIN — APIXABAN 2.5 MILLIGRAM(S): 2.5 TABLET, FILM COATED ORAL at 06:09

## 2018-02-19 RX ADMIN — Medication 100 MILLIGRAM(S): at 06:09

## 2018-02-19 RX ADMIN — Medication 3 MILLILITER(S): at 17:39

## 2018-02-19 RX ADMIN — Medication 500 MILLIGRAM(S): at 23:14

## 2018-02-19 RX ADMIN — SIMVASTATIN 40 MILLIGRAM(S): 20 TABLET, FILM COATED ORAL at 23:14

## 2018-02-19 RX ADMIN — OLANZAPINE 15 MILLIGRAM(S): 15 TABLET, FILM COATED ORAL at 11:51

## 2018-02-19 RX ADMIN — Medication 500 MILLIGRAM(S): at 11:51

## 2018-02-19 RX ADMIN — Medication 500 MILLIGRAM(S): at 06:10

## 2018-02-19 RX ADMIN — Medication 20 MILLIGRAM(S): at 11:52

## 2018-02-19 RX ADMIN — Medication 1000 UNIT(S): at 11:51

## 2018-02-19 RX ADMIN — Medication 3 MILLILITER(S): at 06:09

## 2018-02-19 RX ADMIN — Medication 90 MILLIGRAM(S): at 06:09

## 2018-02-19 RX ADMIN — APIXABAN 2.5 MILLIGRAM(S): 2.5 TABLET, FILM COATED ORAL at 17:39

## 2018-02-19 RX ADMIN — TAMSULOSIN HYDROCHLORIDE 0.4 MILLIGRAM(S): 0.4 CAPSULE ORAL at 23:14

## 2018-02-20 DIAGNOSIS — I71.4 ABDOMINAL AORTIC ANEURYSM, WITHOUT RUPTURE: ICD-10-CM

## 2018-02-20 DIAGNOSIS — Z88.8 ALLERGY STATUS TO OTHER DRUGS, MEDICAMENTS AND BIOLOGICAL SUBSTANCES STATUS: ICD-10-CM

## 2018-02-20 DIAGNOSIS — Z79.01 LONG TERM (CURRENT) USE OF ANTICOAGULANTS: ICD-10-CM

## 2018-02-20 DIAGNOSIS — Z79.899 OTHER LONG TERM (CURRENT) DRUG THERAPY: ICD-10-CM

## 2018-02-20 DIAGNOSIS — A41.9 SEPSIS, UNSPECIFIED ORGANISM: ICD-10-CM

## 2018-02-20 DIAGNOSIS — F41.9 ANXIETY DISORDER, UNSPECIFIED: ICD-10-CM

## 2018-02-20 DIAGNOSIS — F31.76 BIPOLAR DISORDER, IN FULL REMISSION, MOST RECENT EPISODE DEPRESSED: ICD-10-CM

## 2018-02-20 DIAGNOSIS — I87.2 VENOUS INSUFFICIENCY (CHRONIC) (PERIPHERAL): ICD-10-CM

## 2018-02-20 DIAGNOSIS — I10 ESSENTIAL (PRIMARY) HYPERTENSION: ICD-10-CM

## 2018-02-20 DIAGNOSIS — K50.919 CROHN'S DISEASE, UNSPECIFIED, WITH UNSPECIFIED COMPLICATIONS: ICD-10-CM

## 2018-02-20 DIAGNOSIS — R33.8 OTHER RETENTION OF URINE: ICD-10-CM

## 2018-02-20 DIAGNOSIS — R11.2 NAUSEA WITH VOMITING, UNSPECIFIED: ICD-10-CM

## 2018-02-20 DIAGNOSIS — I48.91 UNSPECIFIED ATRIAL FIBRILLATION: ICD-10-CM

## 2018-02-20 DIAGNOSIS — D72.829 ELEVATED WHITE BLOOD CELL COUNT, UNSPECIFIED: ICD-10-CM

## 2018-02-20 DIAGNOSIS — R35.0 FREQUENCY OF MICTURITION: ICD-10-CM

## 2018-02-20 DIAGNOSIS — N17.9 ACUTE KIDNEY FAILURE, UNSPECIFIED: ICD-10-CM

## 2018-02-20 DIAGNOSIS — J44.1 CHRONIC OBSTRUCTIVE PULMONARY DISEASE WITH (ACUTE) EXACERBATION: ICD-10-CM

## 2018-02-20 DIAGNOSIS — Z28.21 IMMUNIZATION NOT CARRIED OUT BECAUSE OF PATIENT REFUSAL: ICD-10-CM

## 2018-02-20 DIAGNOSIS — J18.9 PNEUMONIA, UNSPECIFIED ORGANISM: ICD-10-CM

## 2018-02-20 DIAGNOSIS — R60.0 LOCALIZED EDEMA: ICD-10-CM

## 2018-02-20 DIAGNOSIS — E66.9 OBESITY, UNSPECIFIED: ICD-10-CM

## 2018-02-20 DIAGNOSIS — F17.210 NICOTINE DEPENDENCE, CIGARETTES, UNCOMPLICATED: ICD-10-CM

## 2018-02-20 DIAGNOSIS — N40.1 BENIGN PROSTATIC HYPERPLASIA WITH LOWER URINARY TRACT SYMPTOMS: ICD-10-CM

## 2018-02-20 DIAGNOSIS — F20.9 SCHIZOPHRENIA, UNSPECIFIED: ICD-10-CM

## 2018-02-20 DIAGNOSIS — R07.89 OTHER CHEST PAIN: ICD-10-CM

## 2018-02-20 DIAGNOSIS — N13.30 UNSPECIFIED HYDRONEPHROSIS: ICD-10-CM

## 2018-02-20 LAB
ANION GAP SERPL CALC-SCNC: 14 MMOL/L — SIGNIFICANT CHANGE UP (ref 5–17)
BUN SERPL-MCNC: 32 MG/DL — HIGH (ref 7–23)
CALCIUM SERPL-MCNC: 9.7 MG/DL — SIGNIFICANT CHANGE UP (ref 8.4–10.5)
CHLORIDE SERPL-SCNC: 103 MMOL/L — SIGNIFICANT CHANGE UP (ref 96–108)
CO2 SERPL-SCNC: 24 MMOL/L — SIGNIFICANT CHANGE UP (ref 22–31)
CREAT SERPL-MCNC: 2.15 MG/DL — HIGH (ref 0.5–1.3)
GLUCOSE SERPL-MCNC: 97 MG/DL — SIGNIFICANT CHANGE UP (ref 70–99)
POTASSIUM SERPL-MCNC: 4.3 MMOL/L — SIGNIFICANT CHANGE UP (ref 3.5–5.3)
POTASSIUM SERPL-SCNC: 4.3 MMOL/L — SIGNIFICANT CHANGE UP (ref 3.5–5.3)
SODIUM SERPL-SCNC: 141 MMOL/L — SIGNIFICANT CHANGE UP (ref 135–145)

## 2018-02-20 PROCEDURE — 99233 SBSQ HOSP IP/OBS HIGH 50: CPT

## 2018-02-20 RX ORDER — FLUOXETINE HCL 10 MG
1 CAPSULE ORAL
Qty: 0 | Refills: 0 | COMMUNITY

## 2018-02-20 RX ORDER — APIXABAN 2.5 MG/1
1 TABLET, FILM COATED ORAL
Qty: 0 | Refills: 0 | COMMUNITY
Start: 2018-02-20

## 2018-02-20 RX ORDER — LITHIUM CARBONATE 300 MG/1
0 TABLET, EXTENDED RELEASE ORAL
Qty: 0 | Refills: 0 | COMMUNITY

## 2018-02-20 RX ORDER — SODIUM CHLORIDE 9 MG/ML
1000 INJECTION, SOLUTION INTRAVENOUS
Qty: 0 | Refills: 0 | Status: COMPLETED | OUTPATIENT
Start: 2018-02-20 | End: 2018-02-20

## 2018-02-20 RX ORDER — TAMSULOSIN HYDROCHLORIDE 0.4 MG/1
1 CAPSULE ORAL
Qty: 0 | Refills: 0 | COMMUNITY
Start: 2018-02-20

## 2018-02-20 RX ORDER — LOSARTAN POTASSIUM 100 MG/1
1 TABLET, FILM COATED ORAL
Qty: 0 | Refills: 0 | COMMUNITY

## 2018-02-20 RX ORDER — FLUOXETINE HCL 10 MG
1 CAPSULE ORAL
Qty: 0 | Refills: 0 | COMMUNITY
Start: 2018-02-20

## 2018-02-20 RX ORDER — TERAZOSIN HYDROCHLORIDE 10 MG/1
0 CAPSULE ORAL
Qty: 0 | Refills: 0 | COMMUNITY

## 2018-02-20 RX ADMIN — Medication 1000 UNIT(S): at 11:54

## 2018-02-20 RX ADMIN — Medication 100 MILLIGRAM(S): at 17:25

## 2018-02-20 RX ADMIN — Medication 500 MILLIGRAM(S): at 07:11

## 2018-02-20 RX ADMIN — OLANZAPINE 15 MILLIGRAM(S): 15 TABLET, FILM COATED ORAL at 11:54

## 2018-02-20 RX ADMIN — Medication 0.5 MILLIGRAM(S): at 11:54

## 2018-02-20 RX ADMIN — Medication 90 MILLIGRAM(S): at 07:09

## 2018-02-20 RX ADMIN — Medication 500 MILLIGRAM(S): at 17:25

## 2018-02-20 RX ADMIN — Medication 100 MILLIGRAM(S): at 07:09

## 2018-02-20 RX ADMIN — Medication 20 MILLIGRAM(S): at 11:54

## 2018-02-20 RX ADMIN — Medication 500 MILLIGRAM(S): at 11:54

## 2018-02-20 RX ADMIN — SODIUM CHLORIDE 500 MILLILITER(S): 9 INJECTION, SOLUTION INTRAVENOUS at 14:40

## 2018-02-20 RX ADMIN — APIXABAN 2.5 MILLIGRAM(S): 2.5 TABLET, FILM COATED ORAL at 17:25

## 2018-02-20 RX ADMIN — APIXABAN 2.5 MILLIGRAM(S): 2.5 TABLET, FILM COATED ORAL at 07:09

## 2018-02-20 NOTE — PROGRESS NOTE ADULT - PROBLEM SELECTOR PLAN 10
DASH diet  -replete electrolytes as needed  -c/w daily vitamin D    Dispo    Admit to medicine    PRETTY planning in progress

## 2018-02-20 NOTE — CHART NOTE - NSCHARTNOTEFT_GEN_A_CORE
Pt is a 72 year old M w/pmhx of Afib on eliquis (2.5mg BID) HTN, HLD, COPD (not on home o2 w/o intubation history) abdominal aneurysm (4cm 2017) Crohn's disease, anxiety, schizophrenia and recent hospital admission at [facility] for CAP and found to have urinary retention with mild right sided hydronephrosis who was discharged to his psychiatric independent living facility on 2/15/18 and returned with complaints of weakness and the inability to care for himself. Pt admitted for PRETTY placement. During hospitalization, pt reagan has remained in place, with increased output and pt given fluids to maintain euvolemia. Pt completed abx course for CAP and remained asx. Vitals remained stable during hospitalization. Pending PRETTY placement

## 2018-02-20 NOTE — PROGRESS NOTE ADULT - ATTENDING COMMENTS
Pt seen and examined, VSS, exam as above, labs reviewed, agree with plan as above per Dr Harrison.  In addition to above,   1. Post obstructive diuresis  - pt is 4 L out in less than 24 hrs, pt took in 2L PO and will give 1 L of 1/2NS as well.  Will reassess I/Os every shift to determine if pt needs IV repletion to avoid renal failure from post obstructive diuresis.  Will f/u AM labs.    2. Morbid obesity  - outpt f/u  3. Functional quadriplegia  - for PRETTY

## 2018-02-21 LAB
ANION GAP SERPL CALC-SCNC: 14 MMOL/L — SIGNIFICANT CHANGE UP (ref 5–17)
APPEARANCE UR: CLEAR — SIGNIFICANT CHANGE UP
BILIRUB UR-MCNC: NEGATIVE — SIGNIFICANT CHANGE UP
BUN SERPL-MCNC: 36 MG/DL — HIGH (ref 7–23)
CALCIUM SERPL-MCNC: 9 MG/DL — SIGNIFICANT CHANGE UP (ref 8.4–10.5)
CHLORIDE SERPL-SCNC: 101 MMOL/L — SIGNIFICANT CHANGE UP (ref 96–108)
CO2 SERPL-SCNC: 22 MMOL/L — SIGNIFICANT CHANGE UP (ref 22–31)
COLOR SPEC: YELLOW — SIGNIFICANT CHANGE UP
CREAT SERPL-MCNC: 2.22 MG/DL — HIGH (ref 0.5–1.3)
DIFF PNL FLD: (no result)
GLUCOSE SERPL-MCNC: 105 MG/DL — HIGH (ref 70–99)
GLUCOSE UR QL: NEGATIVE — SIGNIFICANT CHANGE UP
KETONES UR-MCNC: NEGATIVE — SIGNIFICANT CHANGE UP
LEUKOCYTE ESTERASE UR-ACNC: (no result)
NITRITE UR-MCNC: NEGATIVE — SIGNIFICANT CHANGE UP
PH UR: 6 — SIGNIFICANT CHANGE UP (ref 5–8)
PHOSPHATE SERPL-MCNC: 4.4 MG/DL — SIGNIFICANT CHANGE UP (ref 2.5–4.5)
POTASSIUM SERPL-MCNC: 4.2 MMOL/L — SIGNIFICANT CHANGE UP (ref 3.5–5.3)
POTASSIUM SERPL-SCNC: 4.2 MMOL/L — SIGNIFICANT CHANGE UP (ref 3.5–5.3)
PROT UR-MCNC: NEGATIVE MG/DL — SIGNIFICANT CHANGE UP
SODIUM SERPL-SCNC: 137 MMOL/L — SIGNIFICANT CHANGE UP (ref 135–145)
SP GR SPEC: <=1.005 — SIGNIFICANT CHANGE UP (ref 1–1.03)
UROBILINOGEN FLD QL: 0.2 E.U./DL — SIGNIFICANT CHANGE UP

## 2018-02-21 PROCEDURE — 99233 SBSQ HOSP IP/OBS HIGH 50: CPT

## 2018-02-21 RX ORDER — ACETAMINOPHEN 500 MG
650 TABLET ORAL EVERY 6 HOURS
Qty: 0 | Refills: 0 | Status: DISCONTINUED | OUTPATIENT
Start: 2018-02-21 | End: 2018-02-22

## 2018-02-21 RX ORDER — SODIUM CHLORIDE 9 MG/ML
1000 INJECTION INTRAMUSCULAR; INTRAVENOUS; SUBCUTANEOUS
Qty: 0 | Refills: 0 | Status: COMPLETED | OUTPATIENT
Start: 2018-02-21 | End: 2018-02-21

## 2018-02-21 RX ORDER — LIDOCAINE 4 G/100G
1 CREAM TOPICAL ONCE
Qty: 0 | Refills: 0 | Status: COMPLETED | OUTPATIENT
Start: 2018-02-21 | End: 2018-02-21

## 2018-02-21 RX ADMIN — TAMSULOSIN HYDROCHLORIDE 0.4 MILLIGRAM(S): 0.4 CAPSULE ORAL at 01:55

## 2018-02-21 RX ADMIN — Medication 1000 UNIT(S): at 11:30

## 2018-02-21 RX ADMIN — LIDOCAINE 1 PATCH: 4 CREAM TOPICAL at 11:28

## 2018-02-21 RX ADMIN — Medication 500 MILLIGRAM(S): at 01:55

## 2018-02-21 RX ADMIN — Medication 500 MILLIGRAM(S): at 11:30

## 2018-02-21 RX ADMIN — Medication 500 MILLIGRAM(S): at 18:36

## 2018-02-21 RX ADMIN — SIMVASTATIN 40 MILLIGRAM(S): 20 TABLET, FILM COATED ORAL at 01:55

## 2018-02-21 RX ADMIN — Medication 0.5 MILLIGRAM(S): at 11:31

## 2018-02-21 RX ADMIN — SODIUM CHLORIDE 500 MILLILITER(S): 9 INJECTION INTRAMUSCULAR; INTRAVENOUS; SUBCUTANEOUS at 11:44

## 2018-02-21 RX ADMIN — Medication 100 MILLIGRAM(S): at 18:36

## 2018-02-21 RX ADMIN — OLANZAPINE 15 MILLIGRAM(S): 15 TABLET, FILM COATED ORAL at 11:30

## 2018-02-21 RX ADMIN — APIXABAN 2.5 MILLIGRAM(S): 2.5 TABLET, FILM COATED ORAL at 18:36

## 2018-02-21 RX ADMIN — Medication 500 MILLIGRAM(S): at 06:02

## 2018-02-21 RX ADMIN — Medication 100 MILLIGRAM(S): at 11:29

## 2018-02-21 RX ADMIN — Medication 90 MILLIGRAM(S): at 11:29

## 2018-02-21 RX ADMIN — APIXABAN 2.5 MILLIGRAM(S): 2.5 TABLET, FILM COATED ORAL at 06:02

## 2018-02-21 RX ADMIN — Medication 20 MILLIGRAM(S): at 11:30

## 2018-02-21 NOTE — PROGRESS NOTE ADULT - PROBLEM SELECTOR PLAN 2
-likely secondary to dehydration as above, will continue to trend creatinine daily  -will avoid nephrotoxic drugs  -c/w reagan for obstruction and mild R hydronephrosis
-likely secondary to dehydration as above, will continue to trend creatinine daily, as they are downtrending   -will avoid nephrotoxic drugs  -c/w reagan for obstruction and mild R hydronephrosis
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
-likely secondary to dehydration as above, will continue to trend creatinine daily, as they are downtrending - stable from yesterday  -will avoid nephrotoxic drugs  -c/w reagan for obstruction and mild R hydronephrosis

## 2018-02-21 NOTE — PROGRESS NOTE ADULT - PROBLEM SELECTOR PROBLEM 6
COPD (chronic obstructive pulmonary disease)
A-fib
COPD (chronic obstructive pulmonary disease)
COPD (chronic obstructive pulmonary disease)

## 2018-02-21 NOTE — PROGRESS NOTE ADULT - PROBLEM SELECTOR PLAN 3
Secondary to BPH.  Pt was discharged with a reagan for obstruction and mild R hydronephrosis seen on u/s  -will c/w reagan and plan for follow up while at [facility]  -c/w folmax 0.4mg as per urology during last admission, w/plan for repeat TOV once flomax is therapeutic
likely secondary to BPH,  pt was discharged with a reagan for obstruction and mild R hydronephrosis seen on u/s  -will c/w reagan and plan for follow up while at [facility]  -c/w folmax 0.4mg as per urology during last admission, w/plan for repeat TOV once flomax is therapeutic
likely secondary to BPH,  pt was discharged with a reagan for obstruction and mild R hydronephrosis seen on u/s  -will c/w reagan and plan for follow up while at [facility]  -c/w folmax 0.4mg as per urology during last admission, w/plan for repeat TOV once flomax is therapeutic
likely secondary to BPH,  pt was discharged with a reagan for obstruction and mild R hydronephrosis seen on u/s  -will c/w reagan and plan for follow up while at [facility]  -c/w folmax 0.4mg as per urology during last admission, w/plan for repeat TOV once flomax is therapeutic

## 2018-02-21 NOTE — DIETITIAN INITIAL EVALUATION ADULT. - PROBLEM SELECTOR PLAN 3
likely secondary to BPH, urology saw the patient during his previous admission, pt was discharged with a reagan for obstruction and mild R hydronephrosis seen on u/s  -will c/w reagan and plan for follow up while at [facility]  -c/w folmax 0.4mg as per urology during last admission, w/plan for repeat ROV once flomax is therapeutic

## 2018-02-21 NOTE — PROGRESS NOTE ADULT - ATTENDING COMMENTS
Pt seen and examined, VSS, exam as above, labs pending as refused, agree with plan as above.  In addition to above,   1. Post obstructive diuresis  - pt is 6.4 L out in less than 24 hrs, pt took in unclear amount of PO (maybe 4 L per pt but 1 L per RN) and will give 1 L of NS as well.  No exam MM are moist and pt looks euvolemic.  Will keep reassessing I/Os every shift to determine if pt needs IV repletion to avoid renal failure from post obstructive diuresis.  Will f/u UA and labs to determine if pt voiding b/c drinking copious fluid or if he still have a post obstructive diuresis.    2. Morbid obesity  - outpt f/u  3. Functional quadriplegia  - for [facility] tomorrow if above resolving

## 2018-02-21 NOTE — PROGRESS NOTE ADULT - PROBLEM SELECTOR PLAN 7
-c/w Eliquis 2.5mg BID, Procardia 90mg daily, metoprolol 100mg BID    HTN  -holding ARB given DONNA and positive orthostatics
-c/w home medications Zyprexa 15mg daily, Prozac 20mg daily     # anxiety: Klonopin 0.5mg daily to avoid withdrawal, hold additional 1mg nighttime dose given prior dizziness during last admission after 1mg of Klonopin     -social work initiated plans for approval to SARs given pysch history will require additional approval

## 2018-02-21 NOTE — PROGRESS NOTE ADULT - PROBLEM SELECTOR PLAN 8
-c/w home medications Zyprexa 15mg daily, Prozac 20mg daily,     # anxiety: Klonopin 0.5mg daily to avoid withdrawal, hold additional 1mg nighttime dose given prior dizziness during last admission after 1mg of Klonopin     -social work initiated plans for approval to SARs given tawanda. history will require additional approval
Eliquis

## 2018-02-21 NOTE — PROGRESS NOTE ADULT - PROBLEM SELECTOR PLAN 9
eliquis, SCDs
DASH diet  -replete electrolytes as needed  -c/w daily vitamin D    Dispo    Admit to medicine    [facility] planning in progress
eliquis, SCDs
eliquis, SCDs

## 2018-02-21 NOTE — PROGRESS NOTE ADULT - SUBJECTIVE AND OBJECTIVE BOX
INTERVAL HPI/OVERNIGHT EVENTS:  Patient was seen and examined at bedside. As per nurse and patient, no o/n events, patient resting comfortably. No complaints at this time. He is amenable to go to [facility] today. He complains of no diarrhea and has not had BM today. Patient denies: fever, chills, dizziness, weakness, HA, Changes in vision, CP, palpitations, SOB, cough, N/V/D/C, dysuria, changes in bowel movements, LE edema.    VITAL SIGNS:  T(F): 97.1 (02-17-18 @ 08:30)  HR: 99 (02-17-18 @ 08:30)  BP: 134/91 (02-17-18 @ 08:30)  RR: 16 (02-17-18 @ 08:30)  SpO2: 90% (02-17-18 @ 08:30)  Wt(kg): --    PHYSICAL EXAM:    Constitutional: WDWN, NAD  Eyes: PERRL, EOMI, sclera non-icteric  Neck: supple, trachea midline, no masses, no JVD  Respiratory: CTA b/l, good air entry b/l, no wheezing, no rhonchi, no rales, without accessory muscle use and no intercostal retractions  Cardiovascular: RRR, normal S1S2, no M/R/G  Gastrointestinal: soft, NTND, no masses palpable, BS normal  : Nixon in place  Extremities: Warm, well perfused, pulses equal bilateral upper and lower extremities, no edema, no clubbing  Neurological: AAOx3, CN Grossly intact  Skin: Normal temperature, warm, dry    MEDICATIONS  (STANDING):  ALBUTerol/ipratropium for Nebulization 3 milliLiter(s) Nebulizer every 6 hours  apixaban 2.5 milliGRAM(s) Oral every 12 hours  azithromycin   Tablet 250 milliGRAM(s) Oral once  cefTRIAXone   IVPB 1 Gram(s) IV Intermittent once  cholecalciferol 1000 Unit(s) Oral daily  clonazePAM Tablet 0.5 milliGRAM(s) Oral daily  FLUoxetine 20 milliGRAM(s) Oral daily  metoprolol     tartrate 100 milliGRAM(s) Oral two times a day  NIFEdipine XL 90 milliGRAM(s) Oral daily  OLANZapine 15 milliGRAM(s) Oral daily  simvastatin 40 milliGRAM(s) Oral at bedtime  sulfaSALAzine 500 milliGRAM(s) Oral four times a day  tamsulosin 0.4 milliGRAM(s) Oral at bedtime    MEDICATIONS  (PRN):      Allergies    Thorazine (Unknown)    Intolerances        LABS:                        13.4   11.7  )-----------( 267      ( 16 Feb 2018 15:43 )             39.9     02-17    145  |  104  |  33<H>  ----------------------------<  87  4.0   |  27  |  2.20<H>    Ca    9.9      17 Feb 2018 07:08  Phos  3.8     02-17  Mg     2.3     02-17      PT/INR - ( 16 Feb 2018 15:43 )   PT: 14.8 sec;   INR: 1.33          PTT - ( 16 Feb 2018 15:43 )  PTT:32.2 sec  Urinalysis Basic - ( 16 Feb 2018 19:43 )    Color: x / Appearance: x / SG: x / pH: x  Gluc: x / Ketone: x  / Bili: x / Urobili: x   Blood: x / Protein: x / Nitrite: x   Leuk Esterase: x / RBC: > 10 /HPF / WBC 5-10 /HPF   Sq Epi: x / Non Sq Epi: 0-5 /HPF / Bacteria: Present /HPF        RADIOLOGY & ADDITIONAL TESTS:
INTERVAL HPI/OVERNIGHT EVENTS:  Patient was seen and examined at bedside. As per nurse and patient, no o/n events, patient resting comfortably. No complaints at this time. Patient denies: fever, chills, dizziness, weakness, HA, Changes in vision, CP, palpitations, SOB, cough, N/V/D/C, dysuria, changes in bowel movements, LE edema.    VITAL SIGNS:  T(F): 97.7 (02-19-18 @ 09:30)  HR: 96 (02-19-18 @ 09:30)  BP: 122/92 (02-19-18 @ 09:30)  RR: 18 (02-19-18 @ 09:30)  SpO2: 95% (02-19-18 @ 09:30)  Wt(kg): --    PHYSICAL EXAM:    Constitutional: WDWN, NAD  Eyes: PERRL, EOMI, sclera non-icteric  Neck: supple, trachea midline, no masses, no JVD  Respiratory: CTA b/l, good air entry b/l, no wheezing, no rhonchi, no rales, without accessory muscle use and no intercostal retractions  Cardiovascular: RRR, normal S1S2, no M/R/G  Gastrointestinal: soft, NTND, no masses palpable, BS normal  : Nixon in place  Extremities: Warm, well perfused, pulses equal bilateral upper and lower extremities, no edema, no clubbing  Neurological: AAOx3, CN Grossly intact  Skin: Normal temperature, warm, dry    MEDICATIONS  (STANDING):  ALBUTerol/ipratropium for Nebulization 3 milliLiter(s) Nebulizer every 6 hours  apixaban 2.5 milliGRAM(s) Oral every 12 hours  azithromycin   Tablet 250 milliGRAM(s) Oral once  cefTRIAXone   IVPB 1 Gram(s) IV Intermittent once  cholecalciferol 1000 Unit(s) Oral daily  clonazePAM Tablet 0.5 milliGRAM(s) Oral daily  FLUoxetine 20 milliGRAM(s) Oral daily  metoprolol     tartrate 100 milliGRAM(s) Oral two times a day  NIFEdipine XL 90 milliGRAM(s) Oral daily  OLANZapine 15 milliGRAM(s) Oral daily  simvastatin 40 milliGRAM(s) Oral at bedtime  sulfaSALAzine 500 milliGRAM(s) Oral four times a day  tamsulosin 0.4 milliGRAM(s) Oral at bedtime    MEDICATIONS  (PRN):      Allergies    Thorazine (Unknown)    Intolerances        LABS:                        14.1   9.1   )-----------( 309      ( 18 Feb 2018 06:55 )             44.5     02-19    139  |  100  |  33<H>  ----------------------------<  94  3.8   |  25  |  2.15<H>    Ca    9.8      19 Feb 2018 07:31  Mg     2.2     02-19            RADIOLOGY & ADDITIONAL TESTS:
OVERNIGHT EVENTS: ERIC    SUBJECTIVE / INTERVAL HPI: Patient seen and examined at bedside. States he "has a lot of problems" but refuses to discuss them. Feels weak all over.  Agreeable to work with PT.  Denies pain, SOB, f/c.  Declines nicotine patch.    VITAL SIGNS:  Vital Signs Last 24 Hrs  T(C): 36.6 (21 Feb 2018 04:51), Max: 37.4 (20 Feb 2018 15:09)  T(F): 97.8 (21 Feb 2018 04:51), Max: 99.4 (20 Feb 2018 15:09)  HR: 75 (21 Feb 2018 05:54) (68 - 87)  BP: 112/70 (21 Feb 2018 05:54) (94/66 - 140/83)  BP(mean): --  RR: 17 (21 Feb 2018 04:51) (16 - 20)  SpO2: 90% (21 Feb 2018 04:51) (68% - 91%)    PHYSICAL EXAM:    General: obese elderly male  HEENT: NC/AT; PERRL, anicteric sclera; MMM, perioral tremor  Neck: supple  Cardiovascular: +S1/S2, irregular rhythm  Respiratory: scattered wheezes throughout, no crackles, rhonchi  Gastrointestinal: soft, NT/ND; +BSx4  : reagan in place  Extremities: WWP; trace LE edema, no clubbing or cyanosis  Vascular: 2+ radial, DP/PT pulses B/L  Neurological: AAOx3; no focal deficits    MEDICATIONS:  MEDICATIONS  (STANDING):  ALBUTerol/ipratropium for Nebulization 3 milliLiter(s) Nebulizer every 6 hours  apixaban 2.5 milliGRAM(s) Oral every 12 hours  cholecalciferol 1000 Unit(s) Oral daily  clonazePAM Tablet 0.5 milliGRAM(s) Oral daily  FLUoxetine 20 milliGRAM(s) Oral daily  metoprolol     tartrate 100 milliGRAM(s) Oral two times a day  NIFEdipine XL 90 milliGRAM(s) Oral daily  OLANZapine 15 milliGRAM(s) Oral daily  simvastatin 40 milliGRAM(s) Oral at bedtime  sulfaSALAzine 500 milliGRAM(s) Oral four times a day  tamsulosin 0.4 milliGRAM(s) Oral at bedtime    MEDICATIONS  (PRN):    ALLERGIES:  Allergies    Thorazine (Unknown)    Intolerances    LABS:    02-20    141  |  103  |  32<H>  ----------------------------<  97  4.3   |  24  |  2.15<H>    Ca    9.7      20 Feb 2018 06:39    CAPILLARY BLOOD GLUCOSE    RADIOLOGY & ADDITIONAL TESTS: Reviewed.
Pt seen and examined at bedside.      INTERVAL HPI/OVERNIGHT EVENTS:  Only c/o pain when moving neck, since being in hospital.    Review of Systems: 12 point review of systems otherwise negative    MEDICATIONS  (STANDING):  ALBUTerol/ipratropium for Nebulization 3 milliLiter(s) Nebulizer every 6 hours  apixaban 2.5 milliGRAM(s) Oral every 12 hours  azithromycin   Tablet 250 milliGRAM(s) Oral once  cefTRIAXone   IVPB 1 Gram(s) IV Intermittent once  cholecalciferol 1000 Unit(s) Oral daily  clonazePAM Tablet 0.5 milliGRAM(s) Oral daily  FLUoxetine 20 milliGRAM(s) Oral daily  metoprolol     tartrate 100 milliGRAM(s) Oral two times a day  NIFEdipine XL 90 milliGRAM(s) Oral daily  OLANZapine 15 milliGRAM(s) Oral daily  simvastatin 40 milliGRAM(s) Oral at bedtime  sulfaSALAzine 500 milliGRAM(s) Oral four times a day  tamsulosin 0.4 milliGRAM(s) Oral at bedtime    MEDICATIONS  (PRN):      Allergies    Thorazine (Unknown)    Intolerances        Vital Signs Last 24 Hrs  T(C): 36.6 (18 Feb 2018 08:54), Max: 36.7 (17 Feb 2018 21:10)  T(F): 97.9 (18 Feb 2018 08:54), Max: 98.1 (17 Feb 2018 21:10)  HR: 90 (18 Feb 2018 08:54) (87 - 98)  BP: 140/82 (18 Feb 2018 08:54) (100/74 - 141/86)  BP(mean): --  RR: 16 (18 Feb 2018 08:54) (16 - 18)  SpO2: 94% (18 Feb 2018 08:54) (90% - 94%)  CAPILLARY BLOOD GLUCOSE          02-17 @ 07:01  -  02-18 @ 07:00  --------------------------------------------------------  IN: 0 mL / OUT: 2850 mL / NET: -2850 mL          Gen: NAD  MSK: no nuchal rigidity, able to touch chin to chest  CV: RRR, no murmurs  Pulm: CTAB  Abd: soft, NTND  Ext: no MAYANK    LABS:                        14.1   9.1   )-----------( 309      ( 18 Feb 2018 06:55 )             44.5     02-18    143  |  103  |  33<H>  ----------------------------<  101<H>  4.3   |  25  |  2.26<H>    Ca    9.8      18 Feb 2018 06:55  Phos  3.8     02-17  Mg     2.3     02-18      PT/INR - ( 16 Feb 2018 15:43 )   PT: 14.8 sec;   INR: 1.33          PTT - ( 16 Feb 2018 15:43 )  PTT:32.2 sec  Urinalysis Basic - ( 16 Feb 2018 19:43 )    Color: x / Appearance: x / SG: x / pH: x  Gluc: x / Ketone: x  / Bili: x / Urobili: x   Blood: x / Protein: x / Nitrite: x   Leuk Esterase: x / RBC: > 10 /HPF / WBC 5-10 /HPF   Sq Epi: x / Non Sq Epi: 0-5 /HPF / Bacteria: Present /HPF      Culture Results:   No growth (02-16 @ 19:26)        RADIOLOGY & ADDITIONAL TESTS:    ---------------------------------------------------------------------------  I personally reviewed: [  ]EKG   [  ]CXR    [  ] CT    [  ]Other  ---------------------------------------------------------------------------
INTERVAL HPI/OVERNIGHT EVENTS:  Patient was seen and examined at bedside. As per nurse and patient, no o/n events, patient resting comfortably. No complaints at this time. Patient denies: fever, chills, dizziness, weakness, HA, Changes in vision, CP, palpitations, SOB, cough, N/V/D/C, dysuria, changes in bowel movements, LE edema. Awaiting patiently for [facility].    VITAL SIGNS:  T(F): 97.1 (02-20-18 @ 09:57)  HR: 68 (02-20-18 @ 09:57)  BP: 140/83 (02-20-18 @ 09:57)  RR: 16 (02-20-18 @ 09:57)  SpO2: 68% (02-20-18 @ 09:57)  Wt(kg): --    PHYSICAL EXAM:    Constitutional: WDWN, NAD  Eyes: PERRL, EOMI, sclera non-icteric  Neck: supple, trachea midline, no masses, no JVD  Respiratory: CTA b/l, good air entry b/l, no wheezing, no rhonchi, no rales, without accessory muscle use and no intercostal retractions  Cardiovascular: RRR, normal S1S2, no M/R/G  Gastrointestinal: soft, NTND, no masses palpable, BS normal  : Nixon in place  Extremities: Warm, well perfused, pulses equal bilateral upper and lower extremities, no edema, no clubbing  Neurological: AAOx3, CN Grossly intact  Skin: Normal temperature, warm, dry    MEDICATIONS  (STANDING):  ALBUTerol/ipratropium for Nebulization 3 milliLiter(s) Nebulizer every 6 hours  apixaban 2.5 milliGRAM(s) Oral every 12 hours  cholecalciferol 1000 Unit(s) Oral daily  clonazePAM Tablet 0.5 milliGRAM(s) Oral daily  FLUoxetine 20 milliGRAM(s) Oral daily  metoprolol     tartrate 100 milliGRAM(s) Oral two times a day  NIFEdipine XL 90 milliGRAM(s) Oral daily  OLANZapine 15 milliGRAM(s) Oral daily  simvastatin 40 milliGRAM(s) Oral at bedtime  sulfaSALAzine 500 milliGRAM(s) Oral four times a day  tamsulosin 0.4 milliGRAM(s) Oral at bedtime    MEDICATIONS  (PRN):      Allergies    Thorazine (Unknown)    Intolerances        LABS:    02-20    141  |  103  |  32<H>  ----------------------------<  97  4.3   |  24  |  2.15<H>    Ca    9.7      20 Feb 2018 06:39  Mg     2.2     02-19            RADIOLOGY & ADDITIONAL TESTS:

## 2018-02-21 NOTE — DIETITIAN INITIAL EVALUATION ADULT. - ENERGY NEEDS
Height: 5'9" Weight: 249lbs, IBW 160lbs+/-10%, %%, BMI 36  IBW used for calculations as pt >120% of IBW   Nutrient needs based on [facility] standards of care for maintenance in older adults.

## 2018-02-21 NOTE — PROGRESS NOTE ADULT - PROBLEM SELECTOR PROBLEM 4
CAP (community acquired pneumonia)
CAP (community acquired pneumonia)
Crohns disease
CAP (community acquired pneumonia)

## 2018-02-21 NOTE — DIETITIAN INITIAL EVALUATION ADULT. - OTHER INFO
73 yo M w/ pmhx of Afib, Crohn's disease and recent admission for CAP and urinary retention who presented with weakness and failure to thrive at his independent psych. living facility. Pt currently on DASH/TLC diet and tolerating PO. Reports he does not like the "heavier foods". Has been consuming ~50% meals, opting for fish. Prefers softer foods as he is missing his teeth and does not use dentures. Reports he avoids milk 2/2 poor tolerance d/t Crohns. NKFA or dietary restrictions. Skin and GI WDL per flowsheet. Consider ONS if intake becomes <50% -monitor renal panel.

## 2018-02-21 NOTE — DIETITIAN INITIAL EVALUATION ADULT. - PROBLEM SELECTOR PLAN 8
-c/w home medications Zyprexa 15mg daily, Prozac 20mg daily,     # anxiety: Klonopin 0.5mg daily to avoid withdrawal, hold additional 1mg nighttime dose given prior dizziness during last admission after 1mg of Klonopin     -social work initiated plans for approval to SARs given tawanda. history will require additional approval

## 2018-02-21 NOTE — PROGRESS NOTE ADULT - PROBLEM SELECTOR PLAN 5
-c/w home medications azulfadine 500mg four times daily
-c/w home medications azulfadine 500mg four times daily
-duonebs Q 6hrs for wheezing (on albuterol at home)
-c/w home medications azulfadine 500mg four times daily

## 2018-02-21 NOTE — PROGRESS NOTE ADULT - PROBLEM SELECTOR PLAN 6
-duonebs Q 6hrs for wheezing (on albuterol at home)
-c/w Eliquis 2.5mg BID, Procardia 90mg daily, metoprolol 100mg BID    HTN  -holding ARB given DONNA and positive orthostatics
-duonebs Q 6hrs for wheezing (on albuterol at home)
-duonebs Q 6hrs for wheezing (on albuterol at home)

## 2018-02-21 NOTE — PROGRESS NOTE ADULT - ASSESSMENT
Pt is a 71 yo M w/pmhx of Afib, HTN, HLD, COPD, schizophrenia, anxiety, Crohn's disease and recent admission for CAP and urinary retention who presented with weakness and failure to thrive at his independent psych. living facility.
Pt is a 71 yo M w/pmhx of Afib, HTN, HLD, COPD, schizophrenia, anxiety, Crohn's disease and recent admission for CAP and urinary retention who presented with weakness and failure to thrive at his independent psych. living facility.
Pt is a 73 yo M w/pmhx of Afib, HTN, HLD, COPD, schizophrenia, anxiety, Crohn's disease and recent admission for CAP and urinary retention who presented with weakness and failure to thrive at his independent psych. living facility.
Pt is a 73 yo M w/pmhx of Afib, HTN, HLD, COPD, schizophrenia, anxiety, Crohn's disease and recent admission for CAP and urinary retention who presented with weakness and failure to thrive at his independent psych. living facility.     #Weakness  Evaluated by PT, recommending [facility]    #CKD  Cr 2.3, per review of prior data (8/16 Cr 2.4), this appears to be his baseline  - monitor     #Urinary retention  Likely 2/2 BPH.  Pt was discharged with a reagan for obstruction and mild R hydronephrosis seen on u/s  - c/w reagan and plan for follow up while at [facility]  - c/w folmax 0.4mg as per urology during last admission, w/plan for repeat TOV once flomax is therapeutic    #CAP (community acquired pneumonia)  Completed 5 day course of abx on 2/17    #Chronic Issues  Crohn's disease - c/w home medications azulfadine 500mg four times daily.   COPD (chronic obstructive pulmonary disease) - duonebs Q 6hrs for wheezing (on albuterol at home).  A-fib - c/w Eliquis 2.5mg BID, Procardia 90mg daily, metoprolol 100mg BID  HTN - holding ARB given DONNA and positive orthostatics.   Schizophrenia - c/w home medications Zyprexa 15mg daily, Prozac 20mg daily,   Anxiety - Klonopin 0.5mg daily to avoid withdrawal, hold additional 1mg nighttime dose given prior dizziness during last admission after 1mg of Klonopin     #HI  Dispo: f/u CM re: [facility] planning
Pt is a 71 yo M w/pmhx of Afib, HTN, HLD, COPD, schizophrenia, anxiety, Crohn's disease and recent admission for CAP and urinary retention who presented with weakness and failure to thrive at his independent psych. living facility.

## 2018-02-21 NOTE — PROGRESS NOTE ADULT - PROBLEM SELECTOR PLAN 4
-c/w home medications azulfadine 500mg four times daily
2/17 is day 5/5 for CAP treatment, discharged on oral antibiotics, will give final dose of ceftriaxone 1g and azithromycin 250mg today   -continue to monitor, not meeting SIRS criteria, no SOB, no cough, afebrile
2/17 is day 5/5 for CAP treatment, discharged on oral antibiotics, will give final dose of ceftriaxone 1g and azithromycin 250mg today   -continue to monitor, not meeting SIRS criteria, no SOB, no cough, afebrile  RESOLVED
RESOLVED, completed abx   -continue to monitor, not meeting SIRS criteria, no SOB, no cough, afebrile

## 2018-02-21 NOTE — PROGRESS NOTE ADULT - PROBLEM SELECTOR PLAN 1
Pt reports feeling weak and dizzy since discharge 2/19.  Euvolemic on exam.  Appears deconditioned.  No focal deficits.  -monitor I/Os  -PT w/[facility] planning initiated
pt reports feeling weak at home and dizzy with worsening creatinine since discharge yesterday,   -will hold off on additional IVF at this time as pt is asymptomatic and appears euvolemic on exam, will encourage PO intake  -monitor I/Os  -PT w/[facility] planning initiated   -hold all stool softeners given history of inability to control his stools

## 2018-02-22 ENCOUNTER — TRANSCRIPTION ENCOUNTER (OUTPATIENT)
Age: 73
End: 2018-02-22

## 2018-02-22 VITALS
RESPIRATION RATE: 18 BRPM | TEMPERATURE: 97 F | DIASTOLIC BLOOD PRESSURE: 64 MMHG | HEART RATE: 66 BPM | OXYGEN SATURATION: 95 % | SYSTOLIC BLOOD PRESSURE: 131 MMHG

## 2018-02-22 LAB
ANION GAP SERPL CALC-SCNC: 12 MMOL/L — SIGNIFICANT CHANGE UP (ref 5–17)
BUN SERPL-MCNC: 35 MG/DL — HIGH (ref 7–23)
CALCIUM SERPL-MCNC: 9.5 MG/DL — SIGNIFICANT CHANGE UP (ref 8.4–10.5)
CHLORIDE SERPL-SCNC: 105 MMOL/L — SIGNIFICANT CHANGE UP (ref 96–108)
CO2 SERPL-SCNC: 24 MMOL/L — SIGNIFICANT CHANGE UP (ref 22–31)
CREAT ?TM UR-MCNC: 25 MG/DL — SIGNIFICANT CHANGE UP
CREAT SERPL-MCNC: 2.27 MG/DL — HIGH (ref 0.5–1.3)
GLUCOSE SERPL-MCNC: 87 MG/DL — SIGNIFICANT CHANGE UP (ref 70–99)
MAGNESIUM SERPL-MCNC: 2.2 MG/DL — SIGNIFICANT CHANGE UP (ref 1.6–2.6)
PHOSPHATE SERPL-MCNC: 4.2 MG/DL — SIGNIFICANT CHANGE UP (ref 2.5–4.5)
POTASSIUM SERPL-MCNC: 4.3 MMOL/L — SIGNIFICANT CHANGE UP (ref 3.5–5.3)
POTASSIUM SERPL-SCNC: 4.3 MMOL/L — SIGNIFICANT CHANGE UP (ref 3.5–5.3)
SODIUM SERPL-SCNC: 141 MMOL/L — SIGNIFICANT CHANGE UP (ref 135–145)
SODIUM UR-SCNC: 26 MMOL/L — SIGNIFICANT CHANGE UP

## 2018-02-22 PROCEDURE — 85027 COMPLETE CBC AUTOMATED: CPT

## 2018-02-22 PROCEDURE — 80048 BASIC METABOLIC PNL TOTAL CA: CPT

## 2018-02-22 PROCEDURE — 82570 ASSAY OF URINE CREATININE: CPT

## 2018-02-22 PROCEDURE — 81001 URINALYSIS AUTO W/SCOPE: CPT

## 2018-02-22 PROCEDURE — 97161 PT EVAL LOW COMPLEX 20 MIN: CPT

## 2018-02-22 PROCEDURE — 87086 URINE CULTURE/COLONY COUNT: CPT

## 2018-02-22 PROCEDURE — 84100 ASSAY OF PHOSPHORUS: CPT

## 2018-02-22 PROCEDURE — 85610 PROTHROMBIN TIME: CPT

## 2018-02-22 PROCEDURE — 94640 AIRWAY INHALATION TREATMENT: CPT

## 2018-02-22 PROCEDURE — 85025 COMPLETE CBC W/AUTO DIFF WBC: CPT

## 2018-02-22 PROCEDURE — 84300 ASSAY OF URINE SODIUM: CPT

## 2018-02-22 PROCEDURE — 97116 GAIT TRAINING THERAPY: CPT

## 2018-02-22 PROCEDURE — 99239 HOSP IP/OBS DSCHRG MGMT >30: CPT

## 2018-02-22 PROCEDURE — 85730 THROMBOPLASTIN TIME PARTIAL: CPT

## 2018-02-22 PROCEDURE — 83735 ASSAY OF MAGNESIUM: CPT

## 2018-02-22 PROCEDURE — 99285 EMERGENCY DEPT VISIT HI MDM: CPT | Mod: 25

## 2018-02-22 PROCEDURE — 36415 COLL VENOUS BLD VENIPUNCTURE: CPT

## 2018-02-22 RX ORDER — IPRATROPIUM/ALBUTEROL SULFATE 18-103MCG
3 AEROSOL WITH ADAPTER (GRAM) INHALATION
Qty: 0 | Refills: 0 | COMMUNITY
Start: 2018-02-22

## 2018-02-22 RX ORDER — CHOLECALCIFEROL (VITAMIN D3) 125 MCG
1000 CAPSULE ORAL
Qty: 30 | Refills: 0 | OUTPATIENT
Start: 2018-02-22 | End: 2018-03-23

## 2018-02-22 RX ORDER — ACETAMINOPHEN 500 MG
2 TABLET ORAL
Qty: 0 | Refills: 0 | COMMUNITY
Start: 2018-02-22

## 2018-02-22 RX ORDER — LIDOCAINE 4 G/100G
1 CREAM TOPICAL
Qty: 0 | Refills: 0 | COMMUNITY
Start: 2018-02-22

## 2018-02-22 RX ORDER — LIDOCAINE 4 G/100G
1 CREAM TOPICAL EVERY 24 HOURS
Qty: 0 | Refills: 0 | Status: COMPLETED | OUTPATIENT
Start: 2018-02-22 | End: 2018-02-22

## 2018-02-22 RX ADMIN — APIXABAN 2.5 MILLIGRAM(S): 2.5 TABLET, FILM COATED ORAL at 05:22

## 2018-02-22 RX ADMIN — TAMSULOSIN HYDROCHLORIDE 0.4 MILLIGRAM(S): 0.4 CAPSULE ORAL at 00:20

## 2018-02-22 RX ADMIN — OLANZAPINE 15 MILLIGRAM(S): 15 TABLET, FILM COATED ORAL at 11:58

## 2018-02-22 RX ADMIN — Medication 1000 UNIT(S): at 11:58

## 2018-02-22 RX ADMIN — Medication 500 MILLIGRAM(S): at 00:20

## 2018-02-22 RX ADMIN — Medication 500 MILLIGRAM(S): at 11:59

## 2018-02-22 RX ADMIN — Medication 20 MILLIGRAM(S): at 11:58

## 2018-02-22 RX ADMIN — Medication 500 MILLIGRAM(S): at 05:22

## 2018-02-22 RX ADMIN — Medication 0.5 MILLIGRAM(S): at 11:58

## 2018-02-22 RX ADMIN — Medication 90 MILLIGRAM(S): at 05:22

## 2018-02-22 RX ADMIN — Medication 100 MILLIGRAM(S): at 09:16

## 2018-02-22 RX ADMIN — SIMVASTATIN 40 MILLIGRAM(S): 20 TABLET, FILM COATED ORAL at 00:20

## 2018-02-22 RX ADMIN — LIDOCAINE 1 PATCH: 4 CREAM TOPICAL at 14:44

## 2018-02-22 NOTE — DISCHARGE NOTE ADULT - MEDICATION SUMMARY - MEDICATIONS TO TAKE
I will START or STAY ON the medications listed below when I get home from the hospital:    Azulfidine 500 mg oral tablet  --  by mouth 4 times a day  -- Indication: For Crohns disease    acetaminophen 325 mg oral tablet  -- 2 tab(s) by mouth every 6 hours, As needed, Mild Pain (1 - 3)  -- Indication: For Pain    tamsulosin 0.4 mg oral capsule  -- 1 cap(s) by mouth once a day (at bedtime)  -- Indication: For Urinary retention    apixaban 2.5 mg oral tablet  -- 1 tab(s) by mouth every 12 hours  -- Indication: For A-fib    clonazePAM 0.5 mg oral tablet  --  by mouth once a day  -- Indication: For Axiety    clonazePAM 1 mg oral tablet  --  by mouth once a day (at bedtime)  -- Indication: For Anxiety    FLUoxetine 20 mg oral capsule  -- 1 cap(s) by mouth once a day  -- Indication: For Anxiety    simvastatin 40 mg oral tablet  -- 1 tab(s) by mouth once a day (at bedtime)  -- Indication: For Hyperlipidemia    ZyPREXA 10 mg oral tablet  -- 1 tab(s) by mouth once a day  -- Indication: For Schizophrenia    metoprolol tartrate 100 mg oral tablet  -- 1 tab(s) by mouth 2 times a day  -- Indication: For A-fib    Ventolin HFA 90 mcg/inh inhalation aerosol  -- 2 puff(s) inhaled 4 times a day  -- Indication: For COPD (chronic obstructive pulmonary disease)    ipratropium-albuterol 0.5 mg-2.5 mg/3 mLinhalation solution  -- 3 milliliter(s) inhaled every 6 hours, As Needed  -- Indication: For COPD (chronic obstructive pulmonary disease)    NIFEdipine 90 mg oral tablet, extended release  -- 1 tab(s) by mouth once a day  -- Indication: For Hypertension I will START or STAY ON the medications listed below when I get home from the hospital:    Azulfidine 500 mg oral tablet  --  by mouth 4 times a day  -- Indication: For Crohns disease    acetaminophen 325 mg oral tablet  -- 2 tab(s) by mouth every 6 hours, As needed, Mild Pain (1 - 3)  -- Indication: For Pain    tamsulosin 0.4 mg oral capsule  -- 1 cap(s) by mouth once a day (at bedtime)  -- Indication: For Urinary retention    apixaban 2.5 mg oral tablet  -- 1 tab(s) by mouth every 12 hours  -- Indication: For A-fib    clonazePAM 0.5 mg oral tablet  --  by mouth once a day  -- Indication: For Axiety    clonazePAM 1 mg oral tablet  --  by mouth once a day (at bedtime)  -- Indication: For Anxiety    FLUoxetine 20 mg oral capsule  -- 1 cap(s) by mouth once a day  -- Indication: For Anxiety    simvastatin 40 mg oral tablet  -- 1 tab(s) by mouth once a day (at bedtime)  -- Indication: For Hyperlipidemia    ZyPREXA 10 mg oral tablet  -- 1 tab(s) by mouth once a day  -- Indication: For Schizophrenia    metoprolol tartrate 100 mg oral tablet  -- 1 tab(s) by mouth 2 times a day  -- Indication: For A-fib    Ventolin HFA 90 mcg/inh inhalation aerosol  -- 2 puff(s) inhaled 4 times a day  -- Indication: For COPD (chronic obstructive pulmonary disease)    ipratropium-albuterol 0.5 mg-2.5 mg/3 mLinhalation solution  -- 3 milliliter(s) inhaled every 6 hours, As Needed  -- Indication: For COPD (chronic obstructive pulmonary disease)    NIFEdipine 90 mg oral tablet, extended release  -- 1 tab(s) by mouth once a day  -- Indication: For Hypertension    lidocaine 5% topical film  -- Apply on skin to affected area once a day, As Needed after removing old patch  -- Indication: For Pain

## 2018-02-22 NOTE — DISCHARGE NOTE ADULT - PLAN OF CARE
Physical Therapy Pt came to the hospital with weakness from deconditioning, unable to perform ADLs. PRETTY for rehabilitation. Pt with history of CKD with kidney injury secondary to obstruction from enlarged prostate.  Maintain indwelling reagan until patient is seen by urology. Chronic, no acute exacerbation.  Continue duonebs every 6 hours, can decrease frequency as clinically indicated. Rate controlled. Continue Metoprolol for rate control and apixaban for anticoagulation. No acute flair.  Continue Azulfidine. Continue Olanzipine, fluoxetine, clonazepam Maintain reagan.  Continue Flomax. Maintain reagan.  Continue Flomax.  Please follow up with Dr. Sarabia, the urologist, WITHIN ONE WEEK, for assessment of your urinary retention and discontinuation of your reagan.

## 2018-02-22 NOTE — DISCHARGE NOTE ADULT - PATIENT PORTAL LINK FT
You can access the [facility], offered by [facility], by registering with the following website: [email or web address]

## 2018-02-22 NOTE — DISCHARGE NOTE ADULT - CARE PROVIDER_API CALL
Olaf Sarabia), Urology  [address], [place] [address]  Phone: [phone]  Fax: [phone]

## 2018-02-22 NOTE — DISCHARGE NOTE ADULT - CARE PLAN
Principal Discharge DX:	Weakness  Goal:	Physical Therapy  Assessment and plan of treatment:	Pt came to the hospital with weakness from deconditioning, unable to perform ADLs. PRETTY for rehabilitation.  Secondary Diagnosis:	Renal insufficiency  Assessment and plan of treatment:	Pt with history of CKD with kidney injury secondary to obstruction from enlarged prostate.  Maintain indwelling reagan until patient is seen by urology.  Secondary Diagnosis:	COPD (chronic obstructive pulmonary disease)  Assessment and plan of treatment:	Chronic, no acute exacerbation.  Continue duonebs every 6 hours, can decrease frequency as clinically indicated.  Secondary Diagnosis:	A-fib  Assessment and plan of treatment:	Rate controlled. Continue Metoprolol for rate control and apixaban for anticoagulation.  Secondary Diagnosis:	Crohns disease  Assessment and plan of treatment:	No acute flair.  Continue Azulfidine.  Secondary Diagnosis:	Schizophrenia  Assessment and plan of treatment:	Continue Olanzipine, fluoxetine, clonazepam  Secondary Diagnosis:	Urinary retention  Assessment and plan of treatment:	Maintain reagan.  Continue Flomax. Principal Discharge DX:	Weakness  Goal:	Physical Therapy  Assessment and plan of treatment:	Pt came to the hospital with weakness from deconditioning, unable to perform ADLs. PRETTY for rehabilitation.  Secondary Diagnosis:	Renal insufficiency  Assessment and plan of treatment:	Pt with history of CKD with kidney injury secondary to obstruction from enlarged prostate.  Maintain indwelling reagan until patient is seen by urology.  Secondary Diagnosis:	COPD (chronic obstructive pulmonary disease)  Assessment and plan of treatment:	Chronic, no acute exacerbation.  Continue duonebs every 6 hours, can decrease frequency as clinically indicated.  Secondary Diagnosis:	A-fib  Assessment and plan of treatment:	Rate controlled. Continue Metoprolol for rate control and apixaban for anticoagulation.  Secondary Diagnosis:	Crohns disease  Assessment and plan of treatment:	No acute flair.  Continue Azulfidine.  Secondary Diagnosis:	Schizophrenia  Assessment and plan of treatment:	Continue Olanzipine, fluoxetine, clonazepam  Secondary Diagnosis:	Urinary retention  Assessment and plan of treatment:	Maintain reagan.  Continue Flomax.  Please follow up with Dr. Sarabia, the urologist, WITHIN ONE WEEK, for assessment of your urinary retention and discontinuation of your reagan.

## 2018-02-22 NOTE — DISCHARGE NOTE ADULT - HOSPITAL COURSE
Pt is a 72 year old M w/pmhx of Afib on eliquis (2.5mg BID) HTN, HLD, COPD (not on home o2 w/o intubation history) abdominal aneurysm (4cm 2017) Crohn's disease, anxiety, schizophrenia and recent hospital admission at [facility] for CAP and found to have urinary retention with mild right sided hydronephrosis who was discharged to his psychiatric independent living facility on 2/15/18 and returned with complaints of weakness and the inability to care for himself.  Pt admitted for PRETTY placement.  During hospitalization, pt reagan has remained in place, with increased output and concern for post-obstructive diuresis. Vitals remained stable during hospitalization. On the day of discharge, the patient was seen and examined. Vital signs are stable. Labs and imaging reviewed. Patient is medically optimized and hemodynamically stable. Return precautions discussed, medication teach back done, and importance of physician followup emphasized. The patient verbalized understanding.  Discharge counseling was provided using the COMPLETES-NOW protocol. Teach back was demonstrated by the patient.

## 2018-02-22 NOTE — DISCHARGE NOTE ADULT - MEDICATION SUMMARY - MEDICATIONS TO STOP TAKING
I will STOP taking the medications listed below when I get home from the hospital:    lithium 300 mg oral tablet  --  by mouth 2 times a day    terazosin 1 mg oral tablet  --  by mouth once a day (at bedtime)    losartan 25 mg oral tablet  -- 1 tab(s) by mouth once a day

## 2018-02-22 NOTE — DISCHARGE NOTE ADULT - SECONDARY DIAGNOSIS.
Renal insufficiency COPD (chronic obstructive pulmonary disease) A-fib Crohns disease Schizophrenia Urinary retention

## 2018-02-22 NOTE — DISCHARGE NOTE ADULT - CARE PROVIDERS DIRECT ADDRESSES
[email or web address]

## 2018-02-26 DIAGNOSIS — N40.1 BENIGN PROSTATIC HYPERPLASIA WITH LOWER URINARY TRACT SYMPTOMS: ICD-10-CM

## 2018-02-26 DIAGNOSIS — F20.9 SCHIZOPHRENIA, UNSPECIFIED: ICD-10-CM

## 2018-02-26 DIAGNOSIS — E86.0 DEHYDRATION: ICD-10-CM

## 2018-02-26 DIAGNOSIS — E78.5 HYPERLIPIDEMIA, UNSPECIFIED: ICD-10-CM

## 2018-02-26 DIAGNOSIS — Z87.891 PERSONAL HISTORY OF NICOTINE DEPENDENCE: ICD-10-CM

## 2018-02-26 DIAGNOSIS — I12.0 HYPERTENSIVE CHRONIC KIDNEY DISEASE WITH STAGE 5 CHRONIC KIDNEY DISEASE OR END STAGE RENAL DISEASE: ICD-10-CM

## 2018-02-26 DIAGNOSIS — I71.2 THORACIC AORTIC ANEURYSM, WITHOUT RUPTURE: ICD-10-CM

## 2018-02-26 DIAGNOSIS — N18.4 CHRONIC KIDNEY DISEASE, STAGE 4 (SEVERE): ICD-10-CM

## 2018-02-26 DIAGNOSIS — N13.30 UNSPECIFIED HYDRONEPHROSIS: ICD-10-CM

## 2018-02-26 DIAGNOSIS — R53.1 WEAKNESS: ICD-10-CM

## 2018-02-26 DIAGNOSIS — N17.9 ACUTE KIDNEY FAILURE, UNSPECIFIED: ICD-10-CM

## 2018-02-26 DIAGNOSIS — K50.90 CROHN'S DISEASE, UNSPECIFIED, WITHOUT COMPLICATIONS: ICD-10-CM

## 2018-02-26 DIAGNOSIS — I48.91 UNSPECIFIED ATRIAL FIBRILLATION: ICD-10-CM

## 2018-02-26 DIAGNOSIS — J44.9 CHRONIC OBSTRUCTIVE PULMONARY DISEASE, UNSPECIFIED: ICD-10-CM

## 2018-02-26 DIAGNOSIS — R33.8 OTHER RETENTION OF URINE: ICD-10-CM

## 2018-02-26 DIAGNOSIS — J18.9 PNEUMONIA, UNSPECIFIED ORGANISM: ICD-10-CM

## 2018-02-27 PROBLEM — Z00.00 ENCOUNTER FOR PREVENTIVE HEALTH EXAMINATION: Status: ACTIVE | Noted: 2018-02-27

## 2018-03-01 RX ORDER — CLONAZEPAM 1 MG
1 TABLET ORAL
Qty: 0 | Refills: 0 | COMMUNITY

## 2018-03-01 RX ORDER — METOPROLOL TARTRATE 50 MG
1 TABLET ORAL
Qty: 0 | Refills: 0 | COMMUNITY

## 2018-03-01 RX ORDER — APIXABAN 2.5 MG/1
1 TABLET, FILM COATED ORAL
Qty: 0 | Refills: 0 | COMMUNITY

## 2018-03-01 RX ORDER — OLANZAPINE 15 MG/1
1 TABLET, FILM COATED ORAL
Qty: 0 | Refills: 0 | COMMUNITY

## 2018-03-01 RX ORDER — SULFASALAZINE 500 MG
1 TABLET ORAL
Qty: 0 | Refills: 0 | COMMUNITY

## 2018-03-01 RX ORDER — NIFEDIPINE 30 MG
90 TABLET, EXTENDED RELEASE 24 HR ORAL
Qty: 0 | Refills: 0 | COMMUNITY

## 2018-03-01 RX ORDER — IPRATROPIUM BROMIDE 0.2 MG/ML
1 SOLUTION, NON-ORAL INHALATION
Qty: 0 | Refills: 0 | COMMUNITY

## 2018-03-01 RX ORDER — ALBUTEROL 90 UG/1
1 AEROSOL, METERED ORAL
Qty: 0 | Refills: 0 | COMMUNITY

## 2018-03-01 RX ORDER — FLUOXETINE HCL 10 MG
1 CAPSULE ORAL
Qty: 0 | Refills: 0 | COMMUNITY

## 2018-03-01 RX ORDER — LOSARTAN POTASSIUM 100 MG/1
1 TABLET, FILM COATED ORAL
Qty: 0 | Refills: 0 | COMMUNITY

## 2018-03-01 RX ORDER — SIMVASTATIN 20 MG/1
1 TABLET, FILM COATED ORAL
Qty: 0 | Refills: 0 | COMMUNITY

## 2018-03-01 RX ORDER — CHOLECALCIFEROL (VITAMIN D3) 125 MCG
1 CAPSULE ORAL
Qty: 0 | Refills: 0 | COMMUNITY

## 2018-03-01 RX ORDER — TERAZOSIN HYDROCHLORIDE 10 MG/1
3 CAPSULE ORAL
Qty: 0 | Refills: 0 | COMMUNITY

## 2018-04-13 ENCOUNTER — APPOINTMENT (OUTPATIENT)
Dept: UROLOGY | Facility: CLINIC | Age: 73
End: 2018-04-13

## 2018-10-04 ENCOUNTER — EMERGENCY (EMERGENCY)
Facility: HOSPITAL | Age: 73
LOS: 1 days | Discharge: HOME | End: 2018-10-04
Attending: EMERGENCY MEDICINE | Admitting: EMERGENCY MEDICINE
Payer: MEDICARE

## 2018-10-04 VITALS
TEMPERATURE: 98 F | DIASTOLIC BLOOD PRESSURE: 84 MMHG | OXYGEN SATURATION: 99 % | RESPIRATION RATE: 18 BRPM | SYSTOLIC BLOOD PRESSURE: 123 MMHG | HEART RATE: 101 BPM

## 2018-10-04 VITALS
SYSTOLIC BLOOD PRESSURE: 123 MMHG | HEART RATE: 70 BPM | OXYGEN SATURATION: 96 % | DIASTOLIC BLOOD PRESSURE: 88 MMHG | RESPIRATION RATE: 22 BRPM | TEMPERATURE: 98 F

## 2018-10-04 DIAGNOSIS — I10 ESSENTIAL (PRIMARY) HYPERTENSION: ICD-10-CM

## 2018-10-04 DIAGNOSIS — Z79.899 OTHER LONG TERM (CURRENT) DRUG THERAPY: ICD-10-CM

## 2018-10-04 DIAGNOSIS — E78.5 HYPERLIPIDEMIA, UNSPECIFIED: ICD-10-CM

## 2018-10-04 DIAGNOSIS — I48.91 UNSPECIFIED ATRIAL FIBRILLATION: ICD-10-CM

## 2018-10-04 DIAGNOSIS — R94.31 ABNORMAL ELECTROCARDIOGRAM [ECG] [EKG]: ICD-10-CM

## 2018-10-04 DIAGNOSIS — Z88.8 ALLERGY STATUS TO OTHER DRUGS, MEDICAMENTS AND BIOLOGICAL SUBSTANCES: ICD-10-CM

## 2018-10-04 DIAGNOSIS — R07.89 OTHER CHEST PAIN: ICD-10-CM

## 2018-10-04 DIAGNOSIS — J44.9 CHRONIC OBSTRUCTIVE PULMONARY DISEASE, UNSPECIFIED: ICD-10-CM

## 2018-10-04 LAB
ALBUMIN SERPL ELPH-MCNC: 3.1 G/DL — LOW (ref 3.4–5)
ALP SERPL-CCNC: 69 U/L — SIGNIFICANT CHANGE UP (ref 40–120)
ALT FLD-CCNC: 17 U/L — SIGNIFICANT CHANGE UP (ref 12–42)
ANION GAP SERPL CALC-SCNC: 9 MMOL/L — SIGNIFICANT CHANGE UP (ref 9–16)
APPEARANCE UR: CLEAR — SIGNIFICANT CHANGE UP
AST SERPL-CCNC: 15 U/L — SIGNIFICANT CHANGE UP (ref 15–37)
BASOPHILS NFR BLD AUTO: 0.1 % — SIGNIFICANT CHANGE UP (ref 0–2)
BILIRUB SERPL-MCNC: 0.2 MG/DL — SIGNIFICANT CHANGE UP (ref 0.2–1.2)
BILIRUB UR-MCNC: NEGATIVE — SIGNIFICANT CHANGE UP
BUN SERPL-MCNC: 30 MG/DL — HIGH (ref 7–23)
CALCIUM SERPL-MCNC: 8.6 MG/DL — SIGNIFICANT CHANGE UP (ref 8.5–10.5)
CHLORIDE SERPL-SCNC: 108 MMOL/L — SIGNIFICANT CHANGE UP (ref 96–108)
CK MB BLD-MCNC: 1.34 % — SIGNIFICANT CHANGE UP
CK MB CFR SERPL CALC: 3.3 NG/ML — SIGNIFICANT CHANGE UP (ref 0.5–3.6)
CK SERPL-CCNC: 247 U/L — SIGNIFICANT CHANGE UP (ref 39–308)
CO2 SERPL-SCNC: 26 MMOL/L — SIGNIFICANT CHANGE UP (ref 22–31)
COLOR SPEC: YELLOW — SIGNIFICANT CHANGE UP
CREAT SERPL-MCNC: 2.42 MG/DL — HIGH (ref 0.5–1.3)
DIFF PNL FLD: NEGATIVE — SIGNIFICANT CHANGE UP
EOSINOPHIL NFR BLD AUTO: 0 % — SIGNIFICANT CHANGE UP (ref 0–6)
GLUCOSE SERPL-MCNC: 121 MG/DL — HIGH (ref 70–99)
GLUCOSE UR QL: NEGATIVE — SIGNIFICANT CHANGE UP
HCT VFR BLD CALC: 42 % — SIGNIFICANT CHANGE UP (ref 39–50)
HGB BLD-MCNC: 13.4 G/DL — SIGNIFICANT CHANGE UP (ref 13–17)
IMM GRANULOCYTES NFR BLD AUTO: 0.5 % — SIGNIFICANT CHANGE UP (ref 0–1.5)
KETONES UR-MCNC: NEGATIVE — SIGNIFICANT CHANGE UP
LEUKOCYTE ESTERASE UR-ACNC: NEGATIVE — SIGNIFICANT CHANGE UP
LYMPHOCYTES # BLD AUTO: 25.8 % — SIGNIFICANT CHANGE UP (ref 13–44)
MAGNESIUM SERPL-MCNC: 2.2 MG/DL — SIGNIFICANT CHANGE UP (ref 1.6–2.6)
MCHC RBC-ENTMCNC: 28.9 PG — SIGNIFICANT CHANGE UP (ref 27–34)
MCHC RBC-ENTMCNC: 31.9 G/DL — LOW (ref 32–36)
MCV RBC AUTO: 90.7 FL — SIGNIFICANT CHANGE UP (ref 80–100)
MONOCYTES NFR BLD AUTO: 15.4 % — HIGH (ref 2–14)
NEUTROPHILS NFR BLD AUTO: 58.2 % — SIGNIFICANT CHANGE UP (ref 43–77)
NITRITE UR-MCNC: NEGATIVE — SIGNIFICANT CHANGE UP
PH UR: 7.5 — SIGNIFICANT CHANGE UP (ref 5–8)
PLATELET # BLD AUTO: 199 K/UL — SIGNIFICANT CHANGE UP (ref 150–400)
POTASSIUM SERPL-MCNC: 4.8 MMOL/L — SIGNIFICANT CHANGE UP (ref 3.5–5.3)
POTASSIUM SERPL-SCNC: 4.8 MMOL/L — SIGNIFICANT CHANGE UP (ref 3.5–5.3)
PROT SERPL-MCNC: 7.4 G/DL — SIGNIFICANT CHANGE UP (ref 6.4–8.2)
PROT UR-MCNC: 30 MG/DL
RBC # BLD: 4.63 M/UL — SIGNIFICANT CHANGE UP (ref 4.2–5.8)
RBC # FLD: 13.2 % — SIGNIFICANT CHANGE UP (ref 10.3–14.5)
SODIUM SERPL-SCNC: 143 MMOL/L — SIGNIFICANT CHANGE UP (ref 132–145)
SP GR SPEC: 1.01 — SIGNIFICANT CHANGE UP (ref 1–1.03)
TROPONIN I SERPL-MCNC: <0.017 NG/ML — LOW (ref 0.02–0.06)
TROPONIN I SERPL-MCNC: <0.017 NG/ML — LOW (ref 0.02–0.06)
UROBILINOGEN FLD QL: 0.2 E.U./DL — SIGNIFICANT CHANGE UP
WBC # BLD: 8.1 K/UL — SIGNIFICANT CHANGE UP (ref 3.8–10.5)
WBC # FLD AUTO: 8.1 K/UL — SIGNIFICANT CHANGE UP (ref 3.8–10.5)

## 2018-10-04 PROCEDURE — 71045 X-RAY EXAM CHEST 1 VIEW: CPT | Mod: 26

## 2018-10-04 PROCEDURE — 93010 ELECTROCARDIOGRAM REPORT: CPT

## 2018-10-04 PROCEDURE — 93971 EXTREMITY STUDY: CPT | Mod: 26,LT

## 2018-10-04 PROCEDURE — 99285 EMERGENCY DEPT VISIT HI MDM: CPT | Mod: 25

## 2018-10-04 RX ORDER — ASPIRIN/CALCIUM CARB/MAGNESIUM 324 MG
162 TABLET ORAL ONCE
Qty: 0 | Refills: 0 | Status: DISCONTINUED | OUTPATIENT
Start: 2018-10-04 | End: 2018-10-04

## 2018-10-04 RX ORDER — SODIUM CHLORIDE 9 MG/ML
1000 INJECTION INTRAMUSCULAR; INTRAVENOUS; SUBCUTANEOUS ONCE
Qty: 0 | Refills: 0 | Status: COMPLETED | OUTPATIENT
Start: 2018-10-04 | End: 2018-10-04

## 2018-10-04 RX ORDER — ASPIRIN/CALCIUM CARB/MAGNESIUM 324 MG
81 TABLET ORAL DAILY
Qty: 0 | Refills: 0 | Status: DISCONTINUED | OUTPATIENT
Start: 2018-10-04 | End: 2018-10-04

## 2018-10-04 RX ORDER — ASPIRIN/CALCIUM CARB/MAGNESIUM 324 MG
162 TABLET ORAL ONCE
Qty: 0 | Refills: 0 | Status: COMPLETED | OUTPATIENT
Start: 2018-10-04 | End: 2018-10-04

## 2018-10-04 RX ADMIN — SODIUM CHLORIDE 1000 MILLILITER(S): 9 INJECTION INTRAMUSCULAR; INTRAVENOUS; SUBCUTANEOUS at 17:06

## 2018-10-04 RX ADMIN — Medication 162 MILLIGRAM(S): at 17:08

## 2018-10-04 NOTE — ED PROVIDER NOTE - MUSCULOSKELETAL, MLM
Spine appears normal, range of motion is not limited. Chronic bilateral leg swelling, left greater than right.

## 2018-10-04 NOTE — ED PROVIDER NOTE - PMH
Abdominal aneurysm    A-fib    Atrial fibrillation    Chronic renal failure    COPD (chronic obstructive pulmonary disease)    COPD (chronic obstructive pulmonary disease)    Crohns disease    Hyperlipidemia    Hypertension    Liver disease    Schizoaffective disorder    Schizophrenia    Thoracic aortic aneurysm

## 2018-10-04 NOTE — ED ADULT TRIAGE NOTE - CHIEF COMPLAINT QUOTE
Pt. sent from assisted living for an episode of chest pain lasting 1 hour. Pt. given 162mg Aspirin prior to arrival. Denies pain currently

## 2018-10-04 NOTE — ED PROVIDER NOTE - DIAGNOSTIC INTERPRETATION
Interpreted by ED physician  chest x-ray, 1 views  Lungs clear, heart shadow normal, bony structures normal, no free air under diaphragm, no PTX

## 2018-10-04 NOTE — ED PROVIDER NOTE - MEDICAL DECISION MAKING DETAILS
Give aspirin, IV fluids to hydrate, Cardizem. Check labs including troponin and UA. US of the left leg. Check EKG. Give aspirin, IV fluids to hydrate, Cardizem. Check labs including troponin and UA. US of the left leg. Check EKG.  pt initially in afib with RVR; improved with diltiazem and IVF. trop neg x 2. pt chest pain free while in ed and now reports feeling much better and wants to go home.  Pt given option of obs stay overnight and wishes to be discharged.  Will arraign cardio f/u

## 2018-10-04 NOTE — ED ADULT NURSE NOTE - NSIMPLEMENTINTERV_GEN_ALL_ED
Implemented All Universal Safety Interventions:  Freedom to call system. Call bell, personal items and telephone within reach. Instruct patient to call for assistance. Room bathroom lighting operational. Non-slip footwear when patient is off stretcher. Physically safe environment: no spills, clutter or unnecessary equipment. Stretcher in lowest position, wheels locked, appropriate side rails in place.

## 2018-10-04 NOTE — ED ADULT NURSE REASSESSMENT NOTE - NS ED NURSE REASSESS COMMENT FT1
Pt received from ER RN. Pt is A&Ox3 at this time denies cp, sob. Pt asking to be discharged at this time. Pt pending repeat troponin,. Lab sent and pending results. Pt notified will continue to monitor.

## 2018-10-04 NOTE — ED PROVIDER NOTE - CARE PROVIDER_API CALL
Jay Wynn), Cardiovascular Disease; Internal Medicine  [address] [facility]  [address] [place] [address]  Phone: [phone]  Fax: [phone]

## 2018-10-04 NOTE — ED PROVIDER NOTE - OBJECTIVE STATEMENT
72 y o male with PMHX of kidney disease, cirrhosis, A Fib (Eliquis), Bell's Palsy, COPD, HLD, Crohn's Disease, and HTN was BIB ambulance from [facility] for sternal chest pain + SOB for x2.5 hrs today. Admits to have almost voided during scenario. No chest pain upon arrival. Pt also complains of increased swelling in his left leg for more than 20 weeks. Denies syncope, N/V/D, fevers, chills, diaphoresis, abdominal pain, or any other sx.

## 2018-10-05 ENCOUNTER — INBOUND DOCUMENT (OUTPATIENT)
Age: 73
End: 2018-10-05

## 2018-10-05 PROBLEM — I10 ESSENTIAL (PRIMARY) HYPERTENSION: Chronic | Status: ACTIVE | Noted: 2018-02-12

## 2018-10-05 PROBLEM — F20.9 SCHIZOPHRENIA, UNSPECIFIED: Chronic | Status: ACTIVE | Noted: 2018-02-12

## 2018-10-05 PROBLEM — E78.5 HYPERLIPIDEMIA, UNSPECIFIED: Chronic | Status: ACTIVE | Noted: 2018-02-12

## 2018-10-05 PROBLEM — J44.9 CHRONIC OBSTRUCTIVE PULMONARY DISEASE, UNSPECIFIED: Chronic | Status: ACTIVE | Noted: 2018-02-12

## 2018-10-05 PROBLEM — I48.91 UNSPECIFIED ATRIAL FIBRILLATION: Chronic | Status: ACTIVE | Noted: 2018-02-12

## 2018-10-05 PROBLEM — I71.4 ABDOMINAL AORTIC ANEURYSM, WITHOUT RUPTURE: Chronic | Status: ACTIVE | Noted: 2018-02-12

## 2019-08-29 ENCOUNTER — EMERGENCY (EMERGENCY)
Facility: HOSPITAL | Age: 74
LOS: 1 days | Discharge: HOME | End: 2019-08-29
Admitting: EMERGENCY MEDICINE
Payer: MEDICARE

## 2019-08-29 VITALS
TEMPERATURE: 98 F | OXYGEN SATURATION: 99 % | DIASTOLIC BLOOD PRESSURE: 77 MMHG | HEART RATE: 104 BPM | RESPIRATION RATE: 18 BRPM | SYSTOLIC BLOOD PRESSURE: 125 MMHG

## 2019-08-29 VITALS
SYSTOLIC BLOOD PRESSURE: 145 MMHG | OXYGEN SATURATION: 99 % | RESPIRATION RATE: 16 BRPM | DIASTOLIC BLOOD PRESSURE: 102 MMHG | HEART RATE: 85 BPM

## 2019-08-29 LAB
ALBUMIN SERPL ELPH-MCNC: 3.1 G/DL — LOW (ref 3.4–5)
ALP SERPL-CCNC: 75 U/L — SIGNIFICANT CHANGE UP (ref 40–120)
ALT FLD-CCNC: 13 U/L — SIGNIFICANT CHANGE UP (ref 12–42)
ANION GAP SERPL CALC-SCNC: 7 MMOL/L — LOW (ref 9–16)
AST SERPL-CCNC: 15 U/L — SIGNIFICANT CHANGE UP (ref 15–37)
BASOPHILS NFR BLD AUTO: 0.1 % — SIGNIFICANT CHANGE UP (ref 0–2)
BILIRUB SERPL-MCNC: 0.2 MG/DL — SIGNIFICANT CHANGE UP (ref 0.2–1.2)
BUN SERPL-MCNC: 31 MG/DL — HIGH (ref 7–23)
CALCIUM SERPL-MCNC: 9.1 MG/DL — SIGNIFICANT CHANGE UP (ref 8.5–10.5)
CHLORIDE SERPL-SCNC: 109 MMOL/L — HIGH (ref 96–108)
CO2 SERPL-SCNC: 30 MMOL/L — SIGNIFICANT CHANGE UP (ref 22–31)
CREAT SERPL-MCNC: 2.78 MG/DL — HIGH (ref 0.5–1.3)
EOSINOPHIL NFR BLD AUTO: 0 % — SIGNIFICANT CHANGE UP (ref 0–6)
GLUCOSE SERPL-MCNC: 122 MG/DL — HIGH (ref 70–99)
HCT VFR BLD CALC: 41.4 % — SIGNIFICANT CHANGE UP (ref 39–50)
HGB BLD-MCNC: 12.9 G/DL — LOW (ref 13–17)
IMM GRANULOCYTES NFR BLD AUTO: 0.7 % — SIGNIFICANT CHANGE UP (ref 0–1.5)
LYMPHOCYTES # BLD AUTO: 24.9 % — SIGNIFICANT CHANGE UP (ref 13–44)
MCHC RBC-ENTMCNC: 27.9 PG — SIGNIFICANT CHANGE UP (ref 27–34)
MCHC RBC-ENTMCNC: 31.2 G/DL — LOW (ref 32–36)
MCV RBC AUTO: 89.6 FL — SIGNIFICANT CHANGE UP (ref 80–100)
MONOCYTES NFR BLD AUTO: 15.6 % — HIGH (ref 2–14)
NEUTROPHILS NFR BLD AUTO: 58.7 % — SIGNIFICANT CHANGE UP (ref 43–77)
PLATELET # BLD AUTO: 242 K/UL — SIGNIFICANT CHANGE UP (ref 150–400)
POTASSIUM SERPL-MCNC: 4.6 MMOL/L — SIGNIFICANT CHANGE UP (ref 3.5–5.3)
POTASSIUM SERPL-SCNC: 4.6 MMOL/L — SIGNIFICANT CHANGE UP (ref 3.5–5.3)
PROT SERPL-MCNC: 7.1 G/DL — SIGNIFICANT CHANGE UP (ref 6.4–8.2)
RBC # BLD: 4.62 M/UL — SIGNIFICANT CHANGE UP (ref 4.2–5.8)
RBC # FLD: 13.4 % — SIGNIFICANT CHANGE UP (ref 10.3–14.5)
SODIUM SERPL-SCNC: 146 MMOL/L — HIGH (ref 132–145)
TROPONIN I SERPL-MCNC: <0.017 NG/ML — LOW (ref 0.02–0.06)
TROPONIN I SERPL-MCNC: <0.017 NG/ML — LOW (ref 0.02–0.06)
WBC # BLD: 8.7 K/UL — SIGNIFICANT CHANGE UP (ref 3.8–10.5)
WBC # FLD AUTO: 8.7 K/UL — SIGNIFICANT CHANGE UP (ref 3.8–10.5)

## 2019-08-29 PROCEDURE — 93010 ELECTROCARDIOGRAM REPORT: CPT

## 2019-08-29 PROCEDURE — 99285 EMERGENCY DEPT VISIT HI MDM: CPT | Mod: 25

## 2019-08-29 PROCEDURE — 71045 X-RAY EXAM CHEST 1 VIEW: CPT | Mod: 26

## 2019-08-29 RX ORDER — SODIUM CHLORIDE 9 MG/ML
1000 INJECTION INTRAMUSCULAR; INTRAVENOUS; SUBCUTANEOUS ONCE
Refills: 0 | Status: COMPLETED | OUTPATIENT
Start: 2019-08-29 | End: 2019-08-29

## 2019-08-29 RX ORDER — DILTIAZEM HCL 120 MG
10 CAPSULE, EXT RELEASE 24 HR ORAL ONCE
Refills: 0 | Status: COMPLETED | OUTPATIENT
Start: 2019-08-29 | End: 2019-08-29

## 2019-08-29 RX ADMIN — Medication 10 MILLIGRAM(S): at 11:06

## 2019-08-29 RX ADMIN — SODIUM CHLORIDE 2000 MILLILITER(S): 9 INJECTION INTRAMUSCULAR; INTRAVENOUS; SUBCUTANEOUS at 11:06

## 2019-08-29 NOTE — ED ADULT NURSE NOTE - CCCP TRG CHIEF CMPLNT
N/V and abd pain since Saturday. Hx of multiple bowel obstructions. Pt also c/o headache, gum pain, and sore throat. chest pain

## 2019-08-29 NOTE — ED PROVIDER NOTE - OBJECTIVE STATEMENT
PMHx schizophrenia, hyperlipidemia, cirrhosis, AF on eliquis, CKD, COPD active tobacco use 2 packs daily lives in [facility] presents with chest pain x 1-2 hours. denies SOB, admits to chronic cough.

## 2019-08-29 NOTE — ED PROVIDER NOTE - CLINICAL SUMMARY MEDICAL DECISION MAKING FREE TEXT BOX
PMHx COPD active tobacco use, CKD, cirrhosis, A Fib on eliquis presents with substernal chest pain since this morning. found to be in FREDRICK rate 120's140's. now 70-90's after cardizem 10mg IVP. will place on cardiac monitor and continue to monitor with serial ekgs and enzymes

## 2019-08-29 NOTE — ED BEHAVIORAL HEALTH NOTE - BEHAVIORAL HEALTH NOTE
Sw was consulted to the Ed to arrange transport for  the patient. The patient is a 73 year old male [facility] Transportation was arranged through [facility] care EMS with a bill back to facility as the patients insurance does not cover the transport. Transportation was arranged through [employer] ([phone]) for a 6:30pm . The patient is in agreement with the transportation vendor and time. Trip number is [id number]. Team has been made aware of the vendor and p/u time. Senior staff was notified about bill back. worker made available for any further assistance needed.

## 2019-08-29 NOTE — ED ADULT TRIAGE NOTE - CHIEF COMPLAINT QUOTE
Patient to ED with substernal chest pain.  Worse with inspiration.  Hx of COPD.  Patient in no acute distress

## 2019-08-29 NOTE — ED PROVIDER NOTE - PATIENT PORTAL LINK FT
You can access the FollowMyHealth Patient Portal offered by [facility] by registering at the following website: [email or web address]. By joining [employer]’s FollowMyHealth portal, you will also be able to view your health information using other applications (apps) compatible with our system.

## 2019-08-29 NOTE — ED PROVIDER NOTE - PROGRESS NOTE DETAILS
patient given cardizem 10mg IVP for -140's now in AF . chest pain has resolved. message left for Dior miller [facility] [phone]

## 2019-09-02 DIAGNOSIS — E78.5 HYPERLIPIDEMIA, UNSPECIFIED: ICD-10-CM

## 2019-09-02 DIAGNOSIS — Z88.8 ALLERGY STATUS TO OTHER DRUGS, MEDICAMENTS AND BIOLOGICAL SUBSTANCES STATUS: ICD-10-CM

## 2019-09-02 DIAGNOSIS — R07.89 OTHER CHEST PAIN: ICD-10-CM

## 2019-09-02 DIAGNOSIS — F17.200 NICOTINE DEPENDENCE, UNSPECIFIED, UNCOMPLICATED: ICD-10-CM

## 2019-09-02 DIAGNOSIS — I10 ESSENTIAL (PRIMARY) HYPERTENSION: ICD-10-CM

## 2019-09-02 DIAGNOSIS — Z79.899 OTHER LONG TERM (CURRENT) DRUG THERAPY: ICD-10-CM

## 2019-12-10 NOTE — PATIENT PROFILE ADULT. - NS PRO TALK SOMEONE YN
Detail Level: Zone Plan: Due to clinical appearance, and change in size, will defer LN2 and give trial of erivedge. If responsive, will repeat biopsy to continue treatment and confirm deeper lesion. Patient is apprehensive regarding any treatment with biopsy Samples Given: Erivedge no

## 2020-02-15 ENCOUNTER — EMERGENCY (EMERGENCY)
Facility: HOSPITAL | Age: 75
LOS: 1 days | Discharge: HOME | End: 2020-02-15
Admitting: EMERGENCY MEDICINE
Payer: MEDICARE

## 2020-02-15 VITALS
RESPIRATION RATE: 18 BRPM | DIASTOLIC BLOOD PRESSURE: 102 MMHG | HEART RATE: 67 BPM | SYSTOLIC BLOOD PRESSURE: 159 MMHG | WEIGHT: 257.94 LBS | OXYGEN SATURATION: 93 % | TEMPERATURE: 98 F | HEIGHT: 70 IN

## 2020-02-15 PROCEDURE — 70450 CT HEAD/BRAIN W/O DYE: CPT | Mod: 26

## 2020-02-15 PROCEDURE — 99284 EMERGENCY DEPT VISIT MOD MDM: CPT

## 2020-02-15 NOTE — ED ADULT NURSE NOTE - CHIEF COMPLAINT QUOTE
vPt sent from [facility]. BIBA s/p fall. Pt reports losing his balance. +head strike against door knob, denies LOC, on Rivaroxaban. Hx cerebral palsy.

## 2020-02-15 NOTE — ED ADULT TRIAGE NOTE - CHIEF COMPLAINT QUOTE
vPt sent from [facility]. BIBA s/p fall. Pt reports losing his balance. +head strike against door knob, denies LOC, on Rivaroxaban. Hx cerebral palsy.

## 2020-02-15 NOTE — ED PROVIDER NOTE - CHPI ED SYMPTOMS NEG
no HA, nausea, vomiting, vision changes, neck pain, numbness, tingling, weakness, CP, or SOB/no fever/no loss of consciousness

## 2020-02-15 NOTE — ED PROVIDER NOTE - CLINICAL SUMMARY MEDICAL DECISION MAKING FREE TEXT BOX
75 y/o male presents s/p fall and head trauma, no LOC. Will obtain head CT to r/o ICH. Patient is refusing pain meds. Will reassess. See progress note.

## 2020-02-15 NOTE — ED ADULT NURSE NOTE - OBJECTIVE STATEMENT
74y male presents to ED c/o fall. Pt states he fell in his room at [facility] and struck his head on a door knob. Immediately after fall, pt states he had about 5min episode of nausea. Denies LOC, denies pain, nausea, vomiting upon examination. Pt endorses a history of balance issues. Pt unsure of medications. Denies drug and alcohol use. A&Ox3.

## 2020-02-15 NOTE — ED PROVIDER NOTE - PATIENT PORTAL LINK FT
You can access the FollowMyHealth Patient Portal offered by [facility] by registering at the following website: [email or web address]. By joining [employer]’s FollowMyHealth portal, you will also be able to view your health information using other applications (apps) compatible with our system.

## 2020-02-15 NOTE — ED ADULT NURSE REASSESSMENT NOTE - NS ED NURSE REASSESS COMMENT FT1
Received pt from [facility]. Discharge teaching and paperwork provided, pt awaiting transport back to facility.

## 2020-02-15 NOTE — ED PROVIDER NOTE - OBJECTIVE STATEMENT
75 y/o male with PMHx of PMHx schizophrenia, hyperlipidemia, cirrhosis, atrial fibrillation (on Eliquis), CKD, and COPD presents to ED s/p fall. Patient lives in an assisted living facility and has unsteady gait at baseline (walks with a cane). Patient reports he was unsteady on his feet today and fell, and hit his head. Denies any LOC. Patient states this incident occurred about 4.5 hours PTA at around 10AM, and friend noticed he was on the floor within 30 minutes of the fall and helped the patient up. Patient denies any HA, nausea, vomiting, vision changes, neck pain, numbness, tingling, weakness, CP, or SOB. Patient was advised by the medical providers at the residence to come to the ED.

## 2020-02-15 NOTE — ED PROVIDER NOTE - ENMT, MLM
Airway patent. Mouth with normal mucosa. Throat has no vesicles, no oropharyngeal exudates and uvula is midline. No hemotympanum.

## 2020-02-20 DIAGNOSIS — Z79.01 LONG TERM (CURRENT) USE OF ANTICOAGULANTS: ICD-10-CM
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
## 2020-02-20 DIAGNOSIS — N18.9 CHRONIC KIDNEY DISEASE, UNSPECIFIED: ICD-10-CM

## 2020-02-20 DIAGNOSIS — R26.81 UNSTEADINESS ON FEET: ICD-10-CM

## 2020-02-20 DIAGNOSIS — E78.5 HYPERLIPIDEMIA, UNSPECIFIED: ICD-10-CM

## 2020-02-20 DIAGNOSIS — J44.9 CHRONIC OBSTRUCTIVE PULMONARY DISEASE, UNSPECIFIED: ICD-10-CM
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
## 2020-02-20 DIAGNOSIS — S09.90XA UNSPECIFIED INJURY OF HEAD, INITIAL ENCOUNTER: ICD-10-CM
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
## 2020-02-20 DIAGNOSIS — Z88.8 ALLERGY STATUS TO OTHER DRUGS, MEDICAMENTS AND BIOLOGICAL SUBSTANCES STATUS: ICD-10-CM

## 2020-02-20 DIAGNOSIS — I12.9 HYPERTENSIVE CHRONIC KIDNEY DISEASE WITH STAGE 1 THROUGH STAGE 4 CHRONIC KIDNEY DISEASE, OR UNSPECIFIED CHRONIC KIDNEY DISEASE: ICD-10-CM

## 2020-03-12 ENCOUNTER — EMERGENCY (EMERGENCY)
Facility: HOSPITAL | Age: 75
LOS: 1 days | Discharge: HOME | End: 2020-03-12
Admitting: EMERGENCY MEDICINE
Payer: MEDICARE

## 2020-03-12 VITALS
HEART RATE: 58 BPM | OXYGEN SATURATION: 96 % | DIASTOLIC BLOOD PRESSURE: 93 MMHG | SYSTOLIC BLOOD PRESSURE: 153 MMHG | RESPIRATION RATE: 17 BRPM | TEMPERATURE: 98 F

## 2020-03-12 VITALS
TEMPERATURE: 98 F | HEART RATE: 55 BPM | DIASTOLIC BLOOD PRESSURE: 109 MMHG | RESPIRATION RATE: 16 BRPM | OXYGEN SATURATION: 94 % | WEIGHT: 225.09 LBS | SYSTOLIC BLOOD PRESSURE: 171 MMHG

## 2020-03-12 DIAGNOSIS — Z04.3 ENCOUNTER FOR EXAMINATION AND OBSERVATION FOLLOWING OTHER ACCIDENT: ICD-10-CM

## 2020-03-12 DIAGNOSIS — J44.9 CHRONIC OBSTRUCTIVE PULMONARY DISEASE, UNSPECIFIED: ICD-10-CM

## 2020-03-12 DIAGNOSIS — Z79.51 LONG TERM (CURRENT) USE OF INHALED STEROIDS: ICD-10-CM

## 2020-03-12 DIAGNOSIS — Z79.2 LONG TERM (CURRENT) USE OF ANTIBIOTICS: ICD-10-CM

## 2020-03-12 DIAGNOSIS — Z79.899 OTHER LONG TERM (CURRENT) DRUG THERAPY: ICD-10-CM

## 2020-03-12 DIAGNOSIS — E78.5 HYPERLIPIDEMIA, UNSPECIFIED: ICD-10-CM

## 2020-03-12 PROCEDURE — 99283 EMERGENCY DEPT VISIT LOW MDM: CPT

## 2020-03-12 NOTE — ED PROVIDER NOTE - PHYSICAL EXAMINATION
CONSTITUTIONAL: Well-developed; well-nourished; in no acute distress.  	SKIN: Skin is warm and dry, no acute rash.  	HEAD: Normocephalic; atraumatic.  	EYES: PERRL, EOM intact; conjunctiva and sclera clear.  	ENT: No nasal discharge; airway clear.  no tonsillar swelling or exudates, uvula midline, airway patent  	NECK: Supple; non tender.  	CARD: S1, S2 normal; no murmurs, gallops, or rubs. Regular rate and rhythm.  	RESP: No wheezes, rales or rhonchi.  	ABD: soft; non-distended; non-tender  	EXT: Normal ROM x 4. ambulatory  	NEURO: Alert, oriented. Grossly unremarkable.  PSYCH: Cooperative, appropriate.

## 2020-03-12 NOTE — ED PROVIDER NOTE - NSFOLLOWUPINSTRUCTIONS_ED_ALL_ED_FT
Please follow up with your doctor    Return to the Emergency Department for any concerning or worsening symptoms

## 2020-03-12 NOTE — ED ADULT TRIAGE NOTE - CHIEF COMPLAINT QUOTE
Patient states his legs are like sponges, his leg gave out while he was in bathroom causing him to fall. Denies any LOC or injuries states this has happened to him before.

## 2020-03-12 NOTE — ED PROVIDER NOTE - CLINICAL SUMMARY MEDICAL DECISION MAKING FREE TEXT BOX
s/p mechanical fall, no signs of trauma, unremarkable physical exam, patient declining ekg, labs, patient asking to eat, patient given meal, walking in ED with  cane, asking to be discharged, f/u pmd, return precautions discussed. will dc.

## 2020-03-12 NOTE — ED PROVIDER NOTE - OBJECTIVE STATEMENT
75 y/o male with  PMHx schizophrenia, hyperlipidemia, cirrhosis, atrial fibrillation (on Eliquis), CKD, and COPD here from assisted living facility s/p fall. sent from [facility], reports his body and legs feel like jelly causing him to fall. hx falls for similar in the past. uses cane at baseline. denies head injury or LOC. offers no complaints, asking for something to eat. denies headache, neck pain, dizziness, chest pain, dyspnea, abdominal pain, back pain or extremity pain. declining labs/ekg, requesting food.

## 2020-03-12 NOTE — ED PROVIDER NOTE - PATIENT PORTAL LINK FT
You can access the FollowMyHealth Patient Portal offered by [facility] by registering at the following website: [email or web address]. By joining [employer]’s FollowMyHealth portal, you will also be able to view your health information using other applications (apps) compatible with our system.

## 2020-03-12 NOTE — ED ADULT NURSE NOTE - NSIMPLEMENTINTERV_GEN_ALL_ED
Implemented All Fall Risk Interventions:  Irene to call system. Call bell, personal items and telephone within reach. Instruct patient to call for assistance. Room bathroom lighting operational. Non-slip footwear when patient is off stretcher. Physically safe environment: no spills, clutter or unnecessary equipment. Stretcher in lowest position, wheels locked, appropriate side rails in place. Provide visual cue, wrist band, yellow gown, etc. Monitor gait and stability. Monitor for mental status changes and reorient to person, place, and time. Review medications for side effects contributing to fall risk. Reinforce activity limits and safety measures with patient and family.

## 2020-11-20 NOTE — DISCHARGE NOTE ADULT - REASON FOR ADMISSION
-- pleaase get blood work some time next week  -- start new blood pressure med called amlodipine 5 mg per day.   -- continue all of the other meds you are one.   -- get echo of your heart.   
cough

## 2021-02-01 NOTE — ED PROVIDER NOTE - DIAGNOSTIC INTERPRETATION
Otezla Pregnancy And Lactation Text: This medication is Pregnancy Category C and it isn't known if it is safe during pregnancy. It is unknown if it is excreted in breast milk. Xray (wet reads) interpreted by MIGUEL ANGEL SANDERS   CXR - Cardiac silhouette, aortic knob, mediastinal and hilar contours appear wnl, no acute consolidation, infiltrate, effusion, or PTX. No bony abnormalities noted Xray (wet reads) interpreted by MIGUEL ANGEL SANDERS   CXR - Cardiac silhouette, aortic knob, mediastinal and hilar contours appear wnl, +RLL infiltrate and mild congestion, no focal consolidation, effusion, or PTX. No bony abnormalities noted

## 2021-06-16 NOTE — ED PROVIDER NOTE - GASTROINTESTINAL, MLM
Adry Andrea, DO
Abdomen soft, non-tender, no guarding.

## 2022-01-14 NOTE — ED ADULT NURSE NOTE - ATTEMPT TO OOB
VAL FERMIN ; 01/19/2022 ; RAFAEL [facility]  VAL FERMIN ; 02/10/2022 ; NPWILIAM [facility] [address] [facility] no

## 2023-10-02 NOTE — ED PROVIDER NOTE - CROS ED GI ALL NEG
History of Present Illness
History of Present Illness
Chief complaint: BPH W/ OBSTRUCTION
Narrative: 
Patient presents for preadmission testing. The patient reports urinary frequency, urgency, weak stream, incomplete bladder emptying, and nocturia. Patient denies dysuria, hematuria, abdominal pain, nausea, vomiting, fever, or any other complaints.

Review of Systems
ROS  
 Narrative 
REVIEW OF SYSTEMS:  Negative except as stated in HPI, ten or more systems reviewed.
          
   Constitutional:  No fever , chills, weakness
     
     ENT:  No sore throat or epistaxis

     Cardiovascular: No edema, chest pain, palpitations, or activity intolerance

     Respiratory:  No shortness of breath, cough, or wheezing

     Musculoskeletal:  No joint pain or swelling

     Gastrointestinal:  No abdominal pain, constipation, diarrhea, or vomiting
    
     Genitourinary:  No dysuria or hematuria
      
     Neurological:  No numbness, tingling, weakness, or headache
 
     Psychiatric:  No mood changes
   

PFSH
PFSH
Medical History (Updated 10/02/23 @ 11:48 by Zaida Bailey NP)

Back pain
 ?M54.9 - Dorsalgia, unspecified (ICD-10)
Benign prostatic hyperplasia with urinary obstruction and other lower urinary tract symptoms
 ?N40.1 - Benign prostatic hyperplasia with lower urinary tract symptoms (ICD-10)
 ?N13.8 - Other obstructive and reflux uropathy (ICD-10)
BPH (benign prostatic hyperplasia)
 ?N40.0 - Benign prostatic hyperplasia without lower urinary tract symptoms (ICD-10)
Heartburn
 ?R12 - Heartburn (ICD-10)
Hypertension
 ?I10 - Essential (primary) hypertension (ICD-10)
Neck pain
 ?M54.2 - Cervicalgia (ICD-10)
Palpitations
 ?R00.2 - Palpitations (ICD-10)
Sleep apnea
 ?G47.30 - Sleep apnea, unspecified (ICD-10)


Surgical History (Updated 10/02/23 @ 11:15 by Zaida Bailey NP)

H/O cervical spinal arthrodesis
 ?Z98.1 - Arthrodesis status (ICD-10)
History of colonoscopy
 ?Z98.890 - Other specified postprocedural states (ICD-10)
History of spinal surgery
 ?Z98.890 - Other specified postprocedural states (ICD-10)


Family History (Updated 10/02/23 @ 11:15 by Zaida Bailey NP)
Other
 Family history of COPD (chronic obstructive pulmonary disease)
 Family history of diabetes mellitus
 Family history of pancreatic cancer
 Family history of prostate cancer



Social History (Updated 10/02/23 @ 11:11 by Zaida Bailey NP)
Within the past year, how often did you have a drink containing alcohol:  2-3 times a week 
Smoking status:  Former smoker 
Non-prescribed substance use:  denies use 
Highest level of school completed/degree received:  high school graduate 



Meds
Home Medications and Allergies
Home Medications

 Medication  Instructions  Recorded  Confirmed  Type
acetaminophen 325 mg tablet 325 mg PO Q6H PRN pain 10/02/23 10/02/23 History
(Tylenol)    
gabapentin 100 mg capsule 100 mg PO Q8H 10/02/23 10/02/23 History
lisinopril 20 1 tab PO DAILY 10/02/23 10/02/23 History
mg-hydrochlorothiazide 12.5 mg    
tablet    
sildenafil 50 mg tablet 50 mg PO DAILY PRN sexual activity 10/02/23 10/02/23 History
tamsulosin 0.4 mg capsule (Flomax) 0.4 mg PO DAILY 10/02/23 10/02/23 History


Allergies

Allergy/AdvReac Type Severity Reaction Status Date / Time
No Known Drug Allergies Allergy   Verified 10/02/23 11:08



Exam
Narrative
Exam Narrative: 
Constitutional: Awake, alert, comfortable, well-appearing, nontoxic, interactive, vital signs as charted
Head: Normocephalic, atraumatic
Neck: Supple, normal appearance, normal range of motion, no meningeal signs, no lymphadenopathy
Respiratory: No respiratory distress, breath sounds clear
Cardiovascular: Regular rate and rhythm, strong and regular heart tones
Abdomen: Nontender, normal bowel sounds, soft, no CVA tenderness
Musculoskeletal: Normal gait, no swelling or edema
Skin: No rashes or induration, no lesions, only visible skin inspected
Neuro: No neurological deficits, normal sensation
Psychiatric: Oriented ?3, normal affect


Assessment and Plan
Assessment and Plan
(1) BPH (benign prostatic hyperplasia): 
(2) Benign prostatic hyperplasia with urinary obstruction and other lower urinary tract symptoms: 

Plan
Cystoscopy, TUIP, possible TURP Scheduled with Dr. Lazaro 10/12/2023.
negative...

## 2024-06-12 NOTE — PATIENT PROFILE ADULT. - AS SC BRADEN MOISTURE
[FreeTextEntry3] : pt seen and examined and plan discussed with mother, pt and NP.  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  repeat hgb values have been normal.  initially noted low haptoglobin.  no intervention or further eval needed or recommended at this time. f/u PCP as scheduled for routine visit (4) rarely moist

## 2024-08-23 NOTE — ED PROVIDER NOTE - CROS ED CARDIOVAS ALL NEG
[General Appearance - Alert] : alert [General Appearance - In No Acute Distress] : in no acute distress [Auscultation Breath Sounds / Voice Sounds] : lungs were clear to auscultation bilaterally [Full Pulse] : the pedal pulses are present [Edema] : there was no peripheral edema [Oriented To Time, Place, And Person] : oriented to person, place, and time [Impaired Insight] : insight and judgment were intact [Affect] : the affect was normal [FreeTextEntry1] : no synovitis, hands; no ankle swelling negative...